# Patient Record
Sex: FEMALE | Race: WHITE | NOT HISPANIC OR LATINO | Employment: FULL TIME | URBAN - METROPOLITAN AREA
[De-identification: names, ages, dates, MRNs, and addresses within clinical notes are randomized per-mention and may not be internally consistent; named-entity substitution may affect disease eponyms.]

---

## 2017-07-10 ENCOUNTER — HOSPITAL ENCOUNTER (OUTPATIENT)
Dept: LAB | Facility: HOSPITAL | Age: 52
Setting detail: SPECIMEN
Discharge: HOME | End: 2017-07-10
Attending: FAMILY MEDICINE | Admitting: FAMILY MEDICINE

## 2017-07-10 LAB
AMPICILLIN SUSC ISLT: NORMAL
AZTREONAM SUSC ISLT: NORMAL
BACTERIA ISLT: NORMAL
BACTERIA SPEC AEROBE CULT: NORMAL
CEFAZOLIN SUSC ISLT: NORMAL
CEFEPIME SUSC ISLT: NORMAL
CEFTRIAXONE SUSC ISLT: NORMAL
CIPROFLOXACIN SUSC ISLT: NORMAL
COLONY COUNT: NORMAL
ERTAPENEM SUSC ISLT: NORMAL
LEVOFLOXACIN SUSC ISLT: NORMAL
Lab: NORMAL
MEROPENEM SUSC ISLT: NORMAL
MICRO REPORT STATUS: NORMAL
NITROFURANTOIN SUSC ISLT: NORMAL
PIP+TAZO SUSC ISLT: NORMAL
SPECIMEN SOURCE: NORMAL
SUSC METH SPEC: NORMAL
TETRACYCLINE SUSC ISLT: NORMAL
TOBRAMYCIN SUSC ISLT: NORMAL
TRIMETHOPRIM/SULFA: NORMAL

## 2017-07-13 ENCOUNTER — HOSPITAL ENCOUNTER (OUTPATIENT)
Dept: SLEEP MEDICINE | Facility: HOSPITAL | Age: 52
Discharge: HOME | End: 2017-07-13
Attending: INTERNAL MEDICINE | Admitting: INTERNAL MEDICINE

## 2017-10-09 ENCOUNTER — HOSPITAL ENCOUNTER (OUTPATIENT)
Dept: SLEEP MEDICINE | Facility: HOSPITAL | Age: 52
Discharge: HOME | End: 2017-10-09
Attending: INTERNAL MEDICINE | Admitting: INTERNAL MEDICINE

## 2017-12-19 ENCOUNTER — HOSPITAL ENCOUNTER (OUTPATIENT)
Dept: MAMMOGRAPHY | Facility: HOSPITAL | Age: 52
Discharge: HOME | End: 2017-12-19
Attending: OBSTETRICS & GYNECOLOGY | Admitting: OBSTETRICS & GYNECOLOGY

## 2018-03-01 ENCOUNTER — HOSPITAL ENCOUNTER (OUTPATIENT)
Dept: LAB | Facility: HOSPITAL | Age: 53
Discharge: HOME | End: 2018-03-01
Attending: FAMILY MEDICINE | Admitting: FAMILY MEDICINE

## 2018-03-06 ENCOUNTER — ON CAMPUS - OUTPATIENT (AMBULATORY)
Dept: URBAN - METROPOLITAN AREA HOSPITAL 85 | Facility: HOSPITAL | Age: 53
End: 2018-03-06

## 2018-03-06 DIAGNOSIS — K57.90 DIVERTICULOSIS OF INTESTINE, PART UNSPECIFIED, WITHOUT PERFO: ICD-10-CM

## 2018-03-06 DIAGNOSIS — D50.9 IRON DEFICIENCY ANEMIA, UNSPECIFIED: ICD-10-CM

## 2018-03-06 DIAGNOSIS — K64.8 OTHER HEMORRHOIDS: ICD-10-CM

## 2018-03-06 DIAGNOSIS — K28.9 GASTROJEJUNAL ULCER, UNSPECIFIED AS ACUTE OR CHRONIC, WITHOU: ICD-10-CM

## 2018-03-06 DIAGNOSIS — R10.13 EPIGASTRIC PAIN: ICD-10-CM

## 2018-03-06 DIAGNOSIS — K62.5 HEMORRHAGE OF ANUS AND RECTUM: ICD-10-CM

## 2018-03-06 DIAGNOSIS — R11.0 NAUSEA: ICD-10-CM

## 2018-03-06 PROCEDURE — 43235 EGD DIAGNOSTIC BRUSH WASH: CPT | Performed by: INTERNAL MEDICINE

## 2018-03-06 PROCEDURE — 45378 DIAGNOSTIC COLONOSCOPY: CPT | Performed by: INTERNAL MEDICINE

## 2018-10-11 ENCOUNTER — HOSPITAL ENCOUNTER (OUTPATIENT)
Dept: SLEEP MEDICINE | Facility: HOSPITAL | Age: 53
Discharge: HOME | End: 2018-10-11
Attending: INTERNAL MEDICINE | Admitting: INTERNAL MEDICINE

## 2018-12-31 ENCOUNTER — HOSPITAL ENCOUNTER (OUTPATIENT)
Dept: MAMMOGRAPHY | Facility: HOSPITAL | Age: 53
Discharge: HOME | End: 2018-12-31
Attending: OBSTETRICS & GYNECOLOGY | Admitting: OBSTETRICS & GYNECOLOGY

## 2019-10-10 ENCOUNTER — APPOINTMENT (OUTPATIENT)
Dept: SLEEP MEDICINE | Facility: HOSPITAL | Age: 54
End: 2019-10-10

## 2019-10-17 ENCOUNTER — OFFICE VISIT (OUTPATIENT)
Dept: SLEEP MEDICINE | Facility: HOSPITAL | Age: 54
End: 2019-10-17

## 2019-10-17 VITALS
HEART RATE: 76 BPM | OXYGEN SATURATION: 94 % | BODY MASS INDEX: 45.79 KG/M2 | HEIGHT: 65 IN | WEIGHT: 274.8 LBS | SYSTOLIC BLOOD PRESSURE: 149 MMHG | DIASTOLIC BLOOD PRESSURE: 90 MMHG

## 2019-10-17 DIAGNOSIS — G47.33 OBSTRUCTIVE SLEEP APNEA, ADULT: Primary | ICD-10-CM

## 2019-10-17 PROCEDURE — G0463 HOSPITAL OUTPT CLINIC VISIT: HCPCS

## 2019-10-17 RX ORDER — MELATONIN
1000 DAILY
COMMUNITY

## 2019-10-17 RX ORDER — OMEPRAZOLE 40 MG/1
40 CAPSULE, DELAYED RELEASE ORAL DAILY
COMMUNITY

## 2019-10-17 RX ORDER — DILTIAZEM HYDROCHLORIDE 180 MG/1
180 CAPSULE, EXTENDED RELEASE ORAL DAILY
COMMUNITY
End: 2020-03-24 | Stop reason: SDUPTHER

## 2019-10-17 RX ORDER — SUCRALFATE 1 G/1
1 TABLET ORAL 4 TIMES DAILY
COMMUNITY

## 2019-10-17 RX ORDER — MAGNESIUM CARB/ALUMINUM HYDROX 105-160MG
TABLET,CHEWABLE ORAL ONCE
COMMUNITY
End: 2020-04-07

## 2019-10-17 RX ORDER — ASPIRIN 325 MG
325 TABLET, DELAYED RELEASE (ENTERIC COATED) ORAL DAILY
COMMUNITY
End: 2021-04-08 | Stop reason: DRUGHIGH

## 2019-10-17 RX ORDER — FLUOXETINE HYDROCHLORIDE 20 MG/1
20 CAPSULE ORAL DAILY
COMMUNITY

## 2019-10-17 RX ORDER — CHOLECALCIFEROL (VITAMIN D3) 125 MCG
500 CAPSULE ORAL DAILY
COMMUNITY
End: 2020-04-07

## 2019-10-17 NOTE — PROGRESS NOTES
SLEEP MEDICINE CONSULT      Patient Identification:     Name: Bertha Peralta   Age: 53 y.o.   Gender: female   : 1965   MRN: [id number]     Date of consult: 10/17/2019    PCP/Referring Physician(s):     PCP: Betsy Pham MD  Referring Provider: Nathalie Le MD      Chief Complaint:   Obstructive sleep apnea.  Yearly follow-up for compliance.    History of Present Illness:   This is a very pleasant 53 years old  lady who is here today for yearly follow-up for compliance.  Memory card has been downloaded.    She uses a DreamWear full facemask small size.  She finds the mask quite comfortable no air leak did by her.  She finds a pressure quite comfortable.  She uses humidifier on regular basis.  She would like a new machine.  She uses a auto BiPAP mode of therapy with a pressure range between 12-18 and pressure support of 6.    Her usual bedtime is between 10 PM to 11 PM.  Usually dozes off within an hour or so.  She usually wakes up at 6 in the morning but she starts her day at 7 AM.  He has woken up several times at night especially when she changes position.  Pain in neck and other joints wakes her up.  She has a pet cat in her bedroom which disturbs her sleep.  She describes herself a light sleeper easily awakened by noises.  She has woken up twice sometimes at night for a bathroom trip.  She has headache in the morning which is slowly decreasing in severity as well as intensity with the use of CBD oil.  She has severe cervical degenerative joint disease.  Occasionally has a dry mouth at night as well as in the morning.  Sinus congestion at night as well as in the morning.  She denies any symptoms of palpitations or heartburn at night or in the morning.    She wakes up in the morning somewhat awake and alert.  She remains active throughout the day.  Sometimes takes a nap during the daytime.  She tends to doze off during sedentary activities.  Her [place] sleepiness score is  15/24.    Allergies, Medications, and Past History:   Allergies:    No Known Allergies  Current Medications:    Prior to Admission medications    Medication Sig Start Date End Date Taking? Authorizing Provider   aspirin (ECOTRIN) 325 MG EC tablet Take 325 mg by mouth Daily.   Yes Sarah Waters MD   carbonyl iron (FEOSOL) 45 MG tablet tablet Take  by mouth Daily.   Yes Sarah Waters MD   cholecalciferol (VITAMIN D3) 1000 units tablet Take 1,000 Units by mouth Daily.   Yes Sarah Waters MD   dilTIAZem XR (DILT-XR) 180 MG 24 hr capsule Take 180 mg by mouth Daily.   Yes Sarah Waters MD   FLUoxetine (PROzac) 20 MG capsule Take 20 mg by mouth Daily.   Yes Sarah Waters MD   magnesium citrate 1.745 GM/30ML solution solution Take  by mouth 1 (One) Time.   Yes Sarah Waters MD   metFORMIN (GLUCOPHAGE) 500 MG tablet Take 500 mg by mouth 2 (Two) Times a Day With Meals.   Yes Sarah Waters MD   omeprazole (priLOSEC) 40 MG capsule Take 40 mg by mouth Daily.   Yes Sarah Waters MD   rivaroxaban (XARELTO) 20 MG tablet Take 20 mg by mouth Daily.   Yes Sarah Waters MD   sucralfate (CARAFATE) 1 g tablet Take 1 g by mouth 4 (Four) Times a Day.   Yes Sarah Waters MD   vitamin B-12 (CYANOCOBALAMIN) 500 MCG tablet Take 500 mcg by mouth Daily.   Yes Sarah Waters MD     Past Medical History:    Past Medical History:   Diagnosis Date   • AF (paroxysmal atrial fibrillation) (CMS/[facility])    • Coronary artery disease    • GERD (gastroesophageal reflux disease)       Past Surgical History:    Past Surgical History:   Procedure Laterality Date   • CORONARY ARTERY BYPASS GRAFT        Social History:    Social History     Tobacco Use   • Smoking status: Not on file   Substance Use Topics   • Alcohol use: Not on file   • Drug use: Not on file     Family Medical History:  History reviewed. No pertinent family history.      Review of Systems:   Constitutional:   "Denies fever or chills and weight gain  Eyes:  Denies change in visual acuity   HENT:  nasal congestion, sore throat or post nasal drainage change of seasons.  Respiratory:  Denies cough, shortness of breath.  She has experienced  dyspnea on exertion    Cardiovascular:  Denies chest pain or edema   GI:  Denies abdominal pain, nausea, vomiting, bloody stools or diarrhea .  No aerophagia.  :  Denies dysuria or nocturia   Musculoskeletal: She has degenerative joint disease involving cervical as well as lumbosacral spine.  It results in severe headaches.  He takes CBD oil which has decreased her symptoms.    Integument:  Denies rash   Neurologic: Headaches, decreased in severity whith  CBD oil.  n focal weakness or sensory changes. No history of stroke or seizures.  Episodes of loss of consciousness preceded by dizziness and vertigo.  Endocrine:  Denies polyuria or polydipsia   Lymphatic:  Denies swollen glands   Psychiatric:  Denies depression, anxiety or claustrophobia      Physical Exam:     Vitals:    10/17/19 1408   BP: 149/90   Pulse: 76   SpO2: 94%   Weight: 125 kg (274 lb 12.8 oz)   Height: 165.1 cm (65\")     HEENT: Head is normocephalic, atraumatic, normal distribution of hair. Pupils reactive to light. Ocular movements intact.  Moderate nasal congestion on sniff test. No deviated nasal septum. No micrognathia.  Throat: Mallapatti stage 4, tongue midline, mucosa moist.  Neck: no JVD, thyromegaly, mass, +midline trachea, Neck short .  No lymphadenopathy.  Lungs: clear, no wheeze, rhonchi, crackles  Cardiovascular: S1, S2, RRR no murmurs, rubs or gallops  Abd: +BS's soft NT/ND, no CVA tenderness.  Obese.   Ext: no edema, cyanosis, clubbing, pulses intact  Neuro: Awake alert, oriented to time place and person. Grossly intact to motor, sensory cerebral, and cerebellar (without focal deficit)  Psych: No overt kurt, depression, psychosis or inappropriate behavior  Skin: No rash, cellulitis, or ulcerations.  No " facial rash or erosions    DIAGNOSTIC DATA  Memory download shows data from 10/11/2018 to 10/16/2019.  Overall 371 days of data reviewed.  12 days without use.  Percentage of use 96.8%.  Maximum hours used 14 hours 1 minute.  Minimum time used 15 minutes.  96.5% of the time the mask has been used for more than 4 hours.  The apnea hypopnea index is 1.6 events per hour on auto BiPAP mode of therapy with a pressure range between 12-18 and pressure support of 6.  Assessment/Plan:   Obstructive sleep apnea:  This is a very pleasant lady who underwent a nocturnal polysomnogram several years ago.  She presently uses an auto BiPAP mode of therapy.  Her equipment is quite old.  Her humidifier sometimes malfunctions.  She is compliant with therapy.  She is getting some benefit from it.    The results of memory card download were discussed in detail with the patient all questions were answered.  Recommendation.  Advised to continue using the mask with given pressures on a nightly basis.  She is advised to use the mask with given pressures for at least 4 hours a minimum benefit or as long as she sleeps a maximum benefit.  She is advised to use the mask with given pressures if she takes a nap during the daytime.  Allergic rhinitis:  She remains symptomatic.  Recommendation.  Aggressive therapy is recommended.  Obesity:  Based on BMI of 85.7.  Recommendation.  Weight loss is recommended.  Driving instructions. Patient is advised to avoid driving if tired or somnolent. To avoid all alcoholic beverages or medications causing somnolence.         Diagnosis Plan   1. Obstructive sleep apnea, adult  CPAP Therapy     No orders of the defined types were placed in this encounter.    Orders Placed This Encounter   Procedures   • CPAP Therapy     Order Specific Question:   PAP Machine Brand     Answer:   Respironics     Order Specific Question:   PAP Tubing (1 per 3 months)     Answer:    6' Standard tubing     Order Specific Question:    PAP Mask (1 per 3 months)     Answer:    Full face mask     Order Specific Question:   Mask interface (1 per month)     Answer:    Face Mask Interface     Order Specific Question:   PAP Accessories     Answer:    Water Chamber for PAP Humidifier (1 per 6 month) &  Filter PAP Disposable (2 per month) &  Filter PAP Non-Disposable (1 per 6 months) &  Chinstrap to be used with PAP (1 per 6 months) &  Headgear to be used with PAP (1 per 6 mo)     Order Specific Question:   Does the patient have Obstructive Sleep Apnea or other qualifying diagnosis for PAP ordered?     Answer:   Yes     Order Specific Question:   Does the patient require humidification?     Answer:   Yes     Order Specific Question:   Humidifier     Answer:    Humidifier Heated for CPAP or BiPAP     Order Specific Question:   The patient requires a PAP Device & continued use of Supplies to administer effective PAP therapy at the frequency of use ordered above     Answer:   Yes     Order Specific Question:   Initial Supplies and refills authorized for 12 months?     Answer:   Yes     Order Specific Question:   The face to face evaluation was performed on     Answer:   10/17/2019     Order Specific Question:   Which [facility] [place] this being sent to?     Answer:   [facility] [[id number]]     Order Specific Question:   Length of Need (99 Months = Lifetime)     Answer:   99 Months = Lifetime       This document has been electronically signed by:  Nathalie Le MD  10/17/2019  5:23 PM

## 2019-12-23 ENCOUNTER — TRANSCRIBE ORDERS (OUTPATIENT)
Dept: ADMINISTRATIVE | Facility: HOSPITAL | Age: 54
End: 2019-12-23

## 2019-12-23 DIAGNOSIS — Z12.31 BREAST CANCER SCREENING BY MAMMOGRAM: Primary | ICD-10-CM

## 2020-01-02 ENCOUNTER — HOSPITAL ENCOUNTER (OUTPATIENT)
Dept: MAMMOGRAPHY | Facility: HOSPITAL | Age: 55
Discharge: HOME | End: 2020-01-02
Admitting: FAMILY MEDICINE

## 2020-01-02 DIAGNOSIS — Z12.31 BREAST CANCER SCREENING BY MAMMOGRAM: ICD-10-CM

## 2020-01-02 PROCEDURE — 77067 SCR MAMMO BI INCL CAD: CPT

## 2020-01-02 PROCEDURE — 77063 BREAST TOMOSYNTHESIS BI: CPT

## 2020-03-23 PROBLEM — F32.A DEPRESSION: Status: ACTIVE | Noted: 2020-03-23

## 2020-03-23 PROBLEM — I10 BENIGN ESSENTIAL HYPERTENSION: Status: ACTIVE | Noted: 2017-07-21

## 2020-03-23 PROBLEM — I48.0 PAROXYSMAL ATRIAL FIBRILLATION: Status: ACTIVE | Noted: 2020-03-23

## 2020-03-23 PROBLEM — G47.30 SLEEP APNEA: Status: ACTIVE | Noted: 2020-03-23

## 2020-03-23 PROBLEM — K21.9 GASTROESOPHAGEAL REFLUX DISEASE: Status: ACTIVE | Noted: 2020-03-23

## 2020-03-23 RX ORDER — CALCIUM CARBONATE 300MG(750)
TABLET,CHEWABLE ORAL DAILY
COMMUNITY
Start: 2017-07-21

## 2020-03-24 ENCOUNTER — TELEPHONE (OUTPATIENT)
Dept: CARDIOLOGY | Facility: CLINIC | Age: 55
End: 2020-03-24

## 2020-03-24 RX ORDER — DILTIAZEM HYDROCHLORIDE 180 MG/1
180 CAPSULE, EXTENDED RELEASE ORAL DAILY
Qty: 90 CAPSULE | Refills: 3 | Status: SHIPPED | OUTPATIENT
Start: 2020-03-24 | End: 2021-01-12 | Stop reason: SDUPTHER

## 2020-03-26 ENCOUNTER — LAB REQUISITION (OUTPATIENT)
Dept: LAB | Facility: HOSPITAL | Age: 55
End: 2020-03-26

## 2020-03-26 DIAGNOSIS — Z00.00 ENCOUNTER FOR GENERAL ADULT MEDICAL EXAMINATION WITHOUT ABNORMAL FINDINGS: ICD-10-CM

## 2020-03-26 PROCEDURE — U0003 INFECTIOUS AGENT DETECTION BY NUCLEIC ACID (DNA OR RNA); SEVERE ACUTE RESPIRATORY SYNDROME CORONAVIRUS 2 (SARS-COV-2) (CORONAVIRUS DISEASE [COVID-19]), AMPLIFIED PROBE TECHNIQUE, MAKING USE OF HIGH THROUGHPUT TECHNOLOGIES AS DESCRIBED BY CMS-2020-01-R: HCPCS | Performed by: EMERGENCY MEDICINE

## 2020-03-26 PROCEDURE — 87635 SARS-COV-2 COVID-19 AMP PRB: CPT | Performed by: EMERGENCY MEDICINE

## 2020-04-04 LAB — SARS-COV-2 RNA RESP QL NAA+PROBE: NOT DETECTED

## 2020-04-07 ENCOUNTER — OFFICE VISIT (OUTPATIENT)
Dept: CARDIOLOGY | Facility: CLINIC | Age: 55
End: 2020-04-07

## 2020-04-07 VITALS — HEART RATE: 78 BPM | WEIGHT: 285 LBS | BODY MASS INDEX: 47.48 KG/M2 | HEIGHT: 65 IN

## 2020-04-07 DIAGNOSIS — I48.0 PAROXYSMAL ATRIAL FIBRILLATION (HCC): ICD-10-CM

## 2020-04-07 DIAGNOSIS — G47.30 SLEEP APNEA, UNSPECIFIED TYPE: ICD-10-CM

## 2020-04-07 DIAGNOSIS — I10 ESSENTIAL HYPERTENSION: Primary | ICD-10-CM

## 2020-04-07 PROCEDURE — 93000 ELECTROCARDIOGRAM COMPLETE: CPT | Performed by: INTERNAL MEDICINE

## 2020-04-07 PROCEDURE — 99213 OFFICE O/P EST LOW 20 MIN: CPT | Performed by: INTERNAL MEDICINE

## 2020-04-07 RX ORDER — FLUOXETINE HYDROCHLORIDE 40 MG/1
1 CAPSULE ORAL DAILY
COMMUNITY
Start: 2020-02-29

## 2020-04-07 RX ORDER — ALBUTEROL SULFATE 2.5 MG/3ML
SOLUTION RESPIRATORY (INHALATION) AS NEEDED
COMMUNITY
Start: 2020-03-26

## 2020-04-07 RX ORDER — CYANOCOBALAMIN 1000 UG/ML
INJECTION, SOLUTION INTRAMUSCULAR; SUBCUTANEOUS
COMMUNITY
Start: 2020-03-16

## 2020-04-07 NOTE — PROGRESS NOTES
Cardiology Office Visit Note      Referring physician:  Dr. Betsy Pham    Reason For Followup: 1 year follow up a-fib/htn    HPI:  Bertha Peralta is a 54 y.o. female. presents annual FU  history of atypical chest pain,  PAF, hypertensive heart disease, RUTHIE, anxiety and depressive disorder.  Presently maintains SR.    Stress myoview with no reversible ischemia, echocardiogram essentially normal   2017.      Patient activity level is light - moderate    She denies chest pain, dyspnea, PND, orthopnea,  , near syncope, lower extremity edema or feelings of her heart racing.  She has been compliant with her medications.         Past Medical History:   Diagnosis Date   • AF (paroxysmal atrial fibrillation) (CMS/HCC)    • Coronary artery disease    • Essential hypertension 7/21/2017   • Gastroesophageal reflux disease 3/23/2020   • GERD (gastroesophageal reflux disease)    • Paroxysmal atrial fibrillation (CMS/HCC) 3/23/2020   • Sleep apnea 3/23/2020       Past Surgical History:   Procedure Laterality Date   • BREAST BIOPSY     • CORONARY ARTERY BYPASS GRAFT     • HYSTERECTOMY           Current Outpatient Medications:   •  albuterol (PROVENTIL) (2.5 MG/3ML) 0.083% nebulizer solution, As Needed., Disp: , Rfl:   •  aspirin (ECOTRIN) 325 MG EC tablet, Take 325 mg by mouth Daily., Disp: , Rfl:   •  CARBONYL IRON PO, Take 1 tablet by mouth 3 (Three) Times a Week. 65 mg, Disp: , Rfl:   •  cholecalciferol (VITAMIN D3) 1000 units tablet, Take 1,000 Units by mouth Daily., Disp: , Rfl:   •  cyanocobalamin 1000 MCG/ML injection, Every 30 (Thirty) Days., Disp: , Rfl:   •  dilTIAZem XR (Dilt-XR) 180 MG 24 hr capsule, Take 1 capsule by mouth Daily., Disp: 90 capsule, Rfl: 3  •  FLUoxetine (PROzac) 20 MG capsule, Take 20 mg by mouth Daily., Disp: , Rfl:   •  FLUoxetine (PROzac) 40 MG capsule, 1 capsule Daily., Disp: , Rfl:   •  Magnesium 400 MG tablet, MAGNESIUM 400 MG TABS, Disp: , Rfl:   •  metFORMIN (GLUCOPHAGE) 500 MG tablet, Take  "500 mg by mouth 2 (Two) Times a Day With Meals., Disp: , Rfl:   •  omeprazole (priLOSEC) 40 MG capsule, Take 40 mg by mouth Daily., Disp: , Rfl:   •  rivaroxaban (XARELTO) 20 MG tablet, Take 20 mg by mouth Daily., Disp: , Rfl:   •  sucralfate (CARAFATE) 1 g tablet, Take 1 g by mouth 4 (Four) Times a Day., Disp: , Rfl:     Social History     Socioeconomic History   • Marital status:      Spouse name: Not on file   • Number of children: Not on file   • Years of education: Not on file   • Highest education level: Not on file   Tobacco Use   • Smoking status: Never Smoker   • Smokeless tobacco: Never Used   Substance and Sexual Activity   • Alcohol use: Not Currently   • Drug use: Never   • Sexual activity: Defer       Family History   Problem Relation Age of Onset   • Breast cancer Mother 35   • Breast cancer Maternal Grandmother          Review of Systems   General: denies fever, chills, anorexia, weight loss  Eyes: denies blurring, diplopia  Ear/Nose/Throat: denies ear pain, nosebleeds, hoarseness  Cardiovascular: See HPI  Respiratory: denies excessive sputum, hemoptysis, wheezing  Gastrointestinal: denies nausea, vomiting, change in bowel habits, abdominal pain  Genitourinary: Denies hematuria or dysuria  Musculoskeletal: Mild to moderate weightbearing arthralgia  Skin: denies rashes, itching, suspicious lesions  Neurologic: denies focal neuro deficits  Psychiatric: denies depression, anxiety  Endocrine: denies cold intolerance, heat intolerance  Hematologic/Lymphatic: denies abnormal bruising, bleeding  Allergic/Immunologic: denies urticaria or persistent infections      Objective     Visit Vitals  Pulse 78   Ht 165.1 cm (65\")   Wt 129 kg (285 lb)   BMI 47.43 kg/m²           Physical Exam  General:     Obese, well developed,, in no acute distress.    Head:     normocephalic and atraumatic.    Eyes:    PERRL/EOM intact, conjunctiva and sclera clear with out nystagmus.    Neck:    no jvd or bruits  Chest Wall: "    no deformities   Lungs:    clear bilaterally to auscultation with adequate global airflow   Heart:    non-displaced PMI; regular rate and rhythm, normal S1, S2 without murmurs, rubs, or gallops  Abdomen:  Soft, nontender without HSM  Msk:    no deformity; adequate R OM  Pulses:    pulses normal in all 4 extremities.    Extremities:    no clubbing, cyanosis, edema   Neurologic:    no focal sensory or motor deficits  Skin:    intact without lesions or rashes.    Psych:    alert and cooperative; normal mood and affect; normal attention span and concentration.            ECG 12 Lead  Date/Time: 4/7/2020 7:36 AM  Performed by: BRIDGETTE Cruz MD  Authorized by: BRIDGETTE Cruz MD   Comparison: compared with previous ECG from 4/8/2019  Similar to previous ECG  Comments: Sinus rhythm with poor anterior R wave progression              Assessment:   Problems Addressed this Visit        Cardiovascular and Mediastinum    Essential hypertension - Primary  -Remains well-regulated on current medications      Paroxysmal atrial fibrillation (CMS/HCC)  -Maintained in sinus rhythm and fully anticoagulated       Respiratory    Sleep apnea  -Compliant to CPAP            Plan:  Return to clinic 1 year or sooner if needed  Strongly encourage regular progressive exercise with weight reduction diet.  Continue current medications as listed and reviewed in detail    BRIDGETTE Cruz MD  4/7/2020 10:37    This report was generated using the Dragon voice recognition system.

## 2020-08-06 ENCOUNTER — HOSPITAL ENCOUNTER (EMERGENCY)
Facility: HOSPITAL | Age: 55
Discharge: HOME | End: 2020-08-06
Admitting: EMERGENCY MEDICINE

## 2020-08-06 ENCOUNTER — APPOINTMENT (OUTPATIENT)
Dept: GENERAL RADIOLOGY | Facility: HOSPITAL | Age: 55
End: 2020-08-06

## 2020-08-06 VITALS
DIASTOLIC BLOOD PRESSURE: 78 MMHG | WEIGHT: 276.68 LBS | HEIGHT: 65 IN | BODY MASS INDEX: 46.1 KG/M2 | SYSTOLIC BLOOD PRESSURE: 144 MMHG | RESPIRATION RATE: 18 BRPM | TEMPERATURE: 100.9 F | OXYGEN SATURATION: 98 % | HEART RATE: 80 BPM

## 2020-08-06 DIAGNOSIS — R05.9 COUGH: ICD-10-CM

## 2020-08-06 DIAGNOSIS — U07.1 COVID-19: Primary | ICD-10-CM

## 2020-08-06 DIAGNOSIS — R50.9 FEVER, UNSPECIFIED FEVER CAUSE: ICD-10-CM

## 2020-08-06 LAB
ALBUMIN SERPL-MCNC: 3.8 G/DL (ref 3.5–5.2)
ALBUMIN/GLOB SERPL: 1.4 G/DL
ALP SERPL-CCNC: 126 U/L (ref 39–117)
ALT SERPL W P-5'-P-CCNC: 44 U/L (ref 1–33)
ANION GAP SERPL CALCULATED.3IONS-SCNC: 13 MMOL/L (ref 5–15)
AST SERPL-CCNC: 29 U/L (ref 1–32)
BASOPHILS # BLD AUTO: 0 10*3/MM3 (ref 0–0.2)
BASOPHILS NFR BLD AUTO: 0.8 % (ref 0–1.5)
BILIRUB SERPL-MCNC: 0.2 MG/DL (ref 0–1.2)
BUN SERPL-MCNC: 11 MG/DL (ref 6–20)
BUN SERPL-MCNC: ABNORMAL MG/DL
BUN/CREAT SERPL: ABNORMAL
CALCIUM SPEC-SCNC: 8.6 MG/DL (ref 8.6–10.5)
CHLORIDE SERPL-SCNC: 103 MMOL/L (ref 98–107)
CO2 SERPL-SCNC: 24 MMOL/L (ref 22–29)
CREAT SERPL-MCNC: 0.73 MG/DL (ref 0.57–1)
D-LACTATE SERPL-SCNC: 1.8 MMOL/L (ref 0.5–2)
DEPRECATED RDW RBC AUTO: 46.4 FL (ref 37–54)
EOSINOPHIL # BLD AUTO: 0 10*3/MM3 (ref 0–0.4)
EOSINOPHIL NFR BLD AUTO: 0.3 % (ref 0.3–6.2)
ERYTHROCYTE [DISTWIDTH] IN BLOOD BY AUTOMATED COUNT: 16.2 % (ref 12.3–15.4)
FERRITIN SERPL-MCNC: 33.08 NG/ML (ref 13–150)
GFR SERPL CREATININE-BSD FRML MDRD: 82 ML/MIN/1.73
GLOBULIN UR ELPH-MCNC: 2.8 GM/DL
GLUCOSE SERPL-MCNC: 119 MG/DL (ref 65–99)
HCT VFR BLD AUTO: 33.9 % (ref 34–46.6)
HGB BLD-MCNC: 11.1 G/DL (ref 12–15.9)
HOLD SPECIMEN: NORMAL
LYMPHOCYTES # BLD AUTO: 1.6 10*3/MM3 (ref 0.7–3.1)
LYMPHOCYTES NFR BLD AUTO: 33.4 % (ref 19.6–45.3)
MCH RBC QN AUTO: 26.4 PG (ref 26.6–33)
MCHC RBC AUTO-ENTMCNC: 32.6 G/DL (ref 31.5–35.7)
MCV RBC AUTO: 80.9 FL (ref 79–97)
MONOCYTES # BLD AUTO: 0.4 10*3/MM3 (ref 0.1–0.9)
MONOCYTES NFR BLD AUTO: 8.1 % (ref 5–12)
NEUTROPHILS NFR BLD AUTO: 2.8 10*3/MM3 (ref 1.7–7)
NEUTROPHILS NFR BLD AUTO: 57.4 % (ref 42.7–76)
NRBC BLD AUTO-RTO: 0.1 /100 WBC (ref 0–0.2)
PLATELET # BLD AUTO: 307 10*3/MM3 (ref 140–450)
PMV BLD AUTO: 6.6 FL (ref 6–12)
POTASSIUM SERPL-SCNC: 3.9 MMOL/L (ref 3.5–5.2)
PROT SERPL-MCNC: 6.6 G/DL (ref 6–8.5)
RBC # BLD AUTO: 4.19 10*6/MM3 (ref 3.77–5.28)
SODIUM SERPL-SCNC: 140 MMOL/L (ref 136–145)
TROPONIN T SERPL-MCNC: <0.01 NG/ML (ref 0–0.03)
WBC # BLD AUTO: 4.9 10*3/MM3 (ref 3.4–10.8)
WHOLE BLOOD HOLD SPECIMEN: NORMAL

## 2020-08-06 PROCEDURE — 85025 COMPLETE CBC W/AUTO DIFF WBC: CPT | Performed by: NURSE PRACTITIONER

## 2020-08-06 PROCEDURE — 82728 ASSAY OF FERRITIN: CPT | Performed by: NURSE PRACTITIONER

## 2020-08-06 PROCEDURE — 94799 UNLISTED PULMONARY SVC/PX: CPT

## 2020-08-06 PROCEDURE — 99284 EMERGENCY DEPT VISIT MOD MDM: CPT

## 2020-08-06 PROCEDURE — 93005 ELECTROCARDIOGRAM TRACING: CPT

## 2020-08-06 PROCEDURE — 84484 ASSAY OF TROPONIN QUANT: CPT | Performed by: NURSE PRACTITIONER

## 2020-08-06 PROCEDURE — 83605 ASSAY OF LACTIC ACID: CPT

## 2020-08-06 PROCEDURE — 0202U NFCT DS 22 TRGT SARS-COV-2: CPT | Performed by: NURSE PRACTITIONER

## 2020-08-06 PROCEDURE — 94640 AIRWAY INHALATION TREATMENT: CPT

## 2020-08-06 PROCEDURE — 80053 COMPREHEN METABOLIC PANEL: CPT | Performed by: NURSE PRACTITIONER

## 2020-08-06 PROCEDURE — 87040 BLOOD CULTURE FOR BACTERIA: CPT | Performed by: NURSE PRACTITIONER

## 2020-08-06 PROCEDURE — 71045 X-RAY EXAM CHEST 1 VIEW: CPT

## 2020-08-06 RX ORDER — ALBUTEROL SULFATE 90 UG/1
2 AEROSOL, METERED RESPIRATORY (INHALATION) ONCE
Status: COMPLETED | OUTPATIENT
Start: 2020-08-06 | End: 2020-08-06

## 2020-08-06 RX ORDER — SODIUM CHLORIDE 0.9 % (FLUSH) 0.9 %
10 SYRINGE (ML) INJECTION AS NEEDED
Status: DISCONTINUED | OUTPATIENT
Start: 2020-08-06 | End: 2020-08-07 | Stop reason: HOSPADM

## 2020-08-06 RX ADMIN — SODIUM CHLORIDE, SODIUM LACTATE, POTASSIUM CHLORIDE, AND CALCIUM CHLORIDE 1000 ML: .6; .31; .03; .02 INJECTION, SOLUTION INTRAVENOUS at 20:27

## 2020-08-06 RX ADMIN — ALBUTEROL SULFATE 2 PUFF: 90 AEROSOL, METERED RESPIRATORY (INHALATION) at 21:11

## 2020-08-06 NOTE — ED PROVIDER NOTES
"Subjective   54-year-old female presents with multiple complaints; one-week history of: cough, dyspnea, melena, low-grade temp status post \"I took a pill last week and choked on it and it went into my lungs\".  Patient reports that her symptoms have progressively gotten worse over the last week.  She reports she called her primary care physician and was sent in for further work-up.  She also states that she has a history of A. fib is currently taking Xarelto.    1. Location: Anterior chest and ribs  2. Quality: Sore  3. Severity: Mild to moderate  4. Worsening factors: Cough  5. Alleviating factors: Denies  6. Onset: 1 week, progressively getting worse  7. Radiation: Shoulder blades  8. Frequency: Constant with periods of intensity  9. Co-morbidities: Past Medical History:  No date: AF (paroxysmal atrial fibrillation) (CMS/[facility])  No date: Coronary artery disease  7/21/2017: Essential hypertension  3/23/2020: Gastroesophageal reflux disease  No date: GERD (gastroesophageal reflux disease)  3/23/2020: Paroxysmal atrial fibrillation (CMS/[facility])  3/23/2020: Sleep apnea  10. Source: Patient            Review of Systems   Constitutional: Positive for fever. Negative for chills and diaphoresis.   HENT: Negative for congestion, ear pain, rhinorrhea, sneezing, sore throat, trouble swallowing and voice change.    Respiratory: Positive for cough and shortness of breath. Negative for chest tightness, wheezing and stridor.    Cardiovascular: Positive for chest pain. Negative for palpitations and leg swelling.   Gastrointestinal: Positive for blood in stool (melena-today). Negative for abdominal pain, nausea and vomiting.   Genitourinary: Negative for dysuria and flank pain.   Skin: Negative for color change, pallor and rash.   Allergic/Immunologic: Negative for immunocompromised state.   Neurological: Negative for dizziness and weakness.   Psychiatric/Behavioral: Negative for confusion.   All other systems reviewed and are " negative.      Past Medical History:   Diagnosis Date   • AF (paroxysmal atrial fibrillation) (CMS/HCC)    • Coronary artery disease    • Essential hypertension 7/21/2017   • Gastroesophageal reflux disease 3/23/2020   • GERD (gastroesophageal reflux disease)    • Paroxysmal atrial fibrillation (CMS/HCC) 3/23/2020   • Sleep apnea 3/23/2020       No Known Allergies    Past Surgical History:   Procedure Laterality Date   • BREAST BIOPSY     • CORONARY ARTERY BYPASS GRAFT     • HYSTERECTOMY         Family History   Problem Relation Age of Onset   • Breast cancer Mother 35   • Breast cancer Maternal Grandmother        Social History     Socioeconomic History   • Marital status:      Spouse name: Not on file   • Number of children: Not on file   • Years of education: Not on file   • Highest education level: Not on file   Tobacco Use   • Smoking status: Never Smoker   • Smokeless tobacco: Never Used   Substance and Sexual Activity   • Alcohol use: Not Currently   • Drug use: Never   • Sexual activity: Defer           Objective   Physical Exam   Constitutional: She is oriented to person, place, and time. She appears well-developed and well-nourished. She is active and cooperative.  Non-toxic appearance. No distress.   HENT:   Head: Normocephalic and atraumatic.   Right Ear: External ear normal. No drainage.   Left Ear: External ear normal. No drainage.   Nose: Nose normal. No rhinorrhea.   Mouth/Throat: Uvula is midline, oropharynx is clear and moist and mucous membranes are normal.   Eyes: Pupils are equal, round, and reactive to light. Conjunctivae and EOM are normal. No scleral icterus.   Neck: Normal range of motion. Neck supple.   Cardiovascular: Normal rate, regular rhythm, S1 normal, S2 normal, normal heart sounds, intact distal pulses and normal pulses. Exam reveals no gallop and no friction rub.   No murmur heard.  Pulses:       Radial pulses are 2+ on the right side, and 2+ on the left side.         Dorsalis pedis pulses are 2+ on the right side, and 2+ on the left side.        Posterior tibial pulses are 2+ on the right side, and 2+ on the left side.   Pulmonary/Chest: Effort normal and breath sounds normal. No respiratory distress. She has no wheezes. She has no rales. She exhibits no tenderness.   Abdominal: Soft. Bowel sounds are normal. She exhibits no distension and no mass. There is no tenderness. There is no rebound and no guarding. No hernia.   Musculoskeletal: Normal range of motion.   Neurological: She is alert and oriented to person, place, and time. GCS eye subscore is 4. GCS verbal subscore is 5. GCS motor subscore is 6.   Skin: Skin is warm and dry. Capillary refill takes less than 2 seconds. No rash noted. No erythema. No pallor.   Psychiatric: She has a normal mood and affect. Her behavior is normal. Judgment and thought content normal.   Nursing note and vitals reviewed.      Procedures           ED Course  ED Course as of Aug 06 2304   Thu Aug 06, 2020   2210 Awaiting COVID19 and respiratory panel    [AL]      ED Course User Index  [AL] Sendy Frank, NP      Xr Chest Ap    Result Date: 8/6/2020  No acute cardiopulmonary process.  Electronically Signed By-Melina Terry On:8/6/2020 8:48 PM This report was finalized on [id number] by  Melina Terry, .    Medications   sodium chloride 0.9 % flush 10 mL (has no administration in time range)   lactated ringers bolus 1,000 mL (0 mL Intravenous Stopped 8/6/20 2143)   albuterol sulfate HFA (PROVENTIL HFA;VENTOLIN HFA;PROAIR HFA) inhaler 2 puff (2 puffs Inhalation Given 8/6/20 2111)     Labs Reviewed   RESPIRATORY PANEL PCR W/ COVID-19 (SARS-COV-2) SATISH/ALEJANDRO/SHANA IN-HOUSE, NP SWAB IN UTM/VTP, 3-4 HR TAT - Abnormal; Notable for the following components:       Result Value    COVID19 Detected (*)     All other components within normal limits    Narrative:     Fact sheet for providers:  https://docs.[employer]/wp-content/uploads/BRD0576-6408-SR0.1-EUA-Provider-Fact-Sheet-3.pdf    Fact sheet for patients: https://docs.[employer]/wp-content/uploads/OZA7332-7583-BG6.1-EUA-Patient-Fact-Sheet-1.pdf   COMPREHENSIVE METABOLIC PANEL - Abnormal; Notable for the following components:    Glucose 119 (*)     ALT (SGPT) 44 (*)     Alkaline Phosphatase 126 (*)     All other components within normal limits    Narrative:     GFR Normal >60  Chronic Kidney Disease <60  Kidney Failure <15     CBC WITH AUTO DIFFERENTIAL - Abnormal; Notable for the following components:    Hemoglobin 11.1 (*)     Hematocrit 33.9 (*)     MCH 26.4 (*)     RDW 16.2 (*)     All other components within normal limits   FERRITIN - Normal    Narrative:     Results may be falsely decreased if patient taking Biotin.     TROPONIN (IN-HOUSE) - Normal    Narrative:     Troponin T Reference Range:  <= 0.03 ng/mL-   Negative for AMI  >0.03 ng/mL-     Abnormal for myocardial necrosis.  Clinicians would have to utilize clinical acumen, EKG, Troponin and serial changes to determine if it is an Acute Myocardial Infarction or myocardial injury due to an underlying chronic condition.       Results may be falsely decreased if patient taking Biotin.     BUN - Normal   POC LACTATE - Normal   BLOOD CULTURE   BLOOD CULTURE   POC LACTATE   CBC AND DIFFERENTIAL    Narrative:     The following orders were created for panel order CBC & Differential.  Procedure                               Abnormality         Status                     ---------                               -----------         ------                     CBC Auto Differential[[id number]]        Abnormal            Final result                 Please view results for these tests on the individual orders.   EXTRA TUBES    Narrative:     The following orders were created for panel order Extra Tubes.  Procedure                               Abnormality         Status                     ---------  "                              -----------         ------                     Light Blue Top[[id number]]                                   Final result               Gold Top - SST[[id number]]                                   Final result                 Please view results for these tests on the individual orders.   LIGHT BLUE TOP   GOLD TOP - [facility]                                          MDM  Number of Diagnoses or Management Options  Cough:   COVID-19:   Fever, unspecified fever cause:   Diagnosis management comments: Chart Review: 4/7/2020 patient was followed up by cardiology for A. fib and hypertension.  Comorbidity: Past Medical History:  No date: AF (paroxysmal atrial fibrillation) (CMS/[facility])  No date: Coronary artery disease  7/21/2017: Essential hypertension  3/23/2020: Gastroesophageal reflux disease  No date: GERD (gastroesophageal reflux disease)  3/23/2020: Paroxysmal atrial fibrillation (CMS/[facility])  3/23/2020: Sleep apnea  Imaging: Was interpreted by physician and reviewed by myself:    Patient undressed and placed in gown for exam.  Appropriate PPE worn during patient exam. 54-year-old female presents with multiple complaints; one-week history of: cough, dyspnea, melena, low-grade temp status post \"I took a pill last week and choked on it and it went into my lungs\".  Patient reports that her symptoms have progressively gotten worse over the last week.  She reports she called her primary care physician and was sent in for further work-up.  She also states that she has a history of A. fib is currently taking Xarelto.  IV established and labs obtained.  COVID19 protocol initiated.  Chest x-ray obtained with the above findings.  Hemoccult negative.  Labs essentially unremarkable, aside from COVID positive. Upon reassessment, she remains pink warm and dry no distress noted.  She reports some relief with albuterol. Patient was instructed to quarantine for 10 days from onset of symptoms, or 3 days fever free and " free of respiratory symptoms.  Patient verbalized understanding.  She is also encouraged to cough and her elbow and practice good hand hygiene.  She is also encouraged to push fluids.      Disposition/Treatment: Discussed results with patient, verbalized understanding.  Discussed reasons to return to the ER, patient verbalized understanding.  Agreeable with plan of care.  Patient was stable upon discharge.               Amount and/or Complexity of Data Reviewed  Clinical lab tests: reviewed  Tests in the medicine section of CPT®: reviewed    Patient Progress  Patient progress: stable      Final diagnoses:   COVID-19   Cough   Fever, unspecified fever cause            Sendy Frank, NP  08/06/20 2383

## 2020-08-07 ENCOUNTER — READMISSION MANAGEMENT (OUTPATIENT)
Dept: CALL CENTER | Facility: HOSPITAL | Age: 55
End: 2020-08-07

## 2020-08-07 LAB

## 2020-08-07 NOTE — DISCHARGE INSTRUCTIONS
As discussed, you will need to quarantine for 10 days after the onset of your symptoms, or 3 days after fever free and free of respiratory symptoms.  Use pulse ox as directed.  Push fluids to stay hydrated.  Cough into your elbow and practice good handwashing.  Wipe hard surfaces down with Lysol and/or bleach.  Follow-up with your primary care physician for recheck.  Return to the ER for any new or worsening symptoms, including but not limited to: Worsening shortness of breath, oxygen saturation of less than 94%.

## 2020-08-07 NOTE — NURSING NOTE
"Pt given water and shows no difficulty with swallowing. Pt repeatedly stated \"I'm fine, I just want to go home.\" Will continue to monitor.   "

## 2020-08-07 NOTE — OUTREACH NOTE
Medical Week 1 Survey      Responses   [facility] patient discharged from?  [facility]   COVID-19 Test Status  Confirmed   Does the patient have one of the following disease processes/diagnoses(primary or secondary)?  Other   Is there a successful TCM telephone encounter documented?  No   Week 1 attempt successful?  Yes   Call start time  1518   Call end time  1520   Discharge diagnosis  low grade fever,  cough,  Covid-19   Meds reviewed with patient/caregiver?  Yes   Is the patient having any side effects they believe may be caused by any medication additions or changes?  No   Does the patient have all medications ordered at discharge?  N/A   Is the patient taking all medications as directed (includes completed medication regime)?  Yes   Does the patient have a primary care provider?   Yes   Does the patient have an appointment with their PCP within 7 days of discharge?  No   What is preventing the patient from scheduling follow up appointments within 7 days of discharge?  Haven't had time   Nursing Interventions  Educated patient on importance of making appointment, Advised patient to make appointment   Has the patient kept scheduled appointments due by today?  N/A   Has home health visited the patient within 72 hours of discharge?  N/A   Pulse Ox monitoring  Intermittent   Pulse Ox device source  Hospital   O2 Sat comments  reports oxygen saturation is 99% and HR is 83   O2 Sat: education provided  Sat levels, When to seek care   Psychosocial issues?  No   Did the patient receive a copy of their discharge instructions?  Yes   Nursing interventions  Reviewed instructions with patient   What is the patient's perception of their health status since discharge?  Improving   Is the patient/caregiver able to teach back signs and symptoms related to disease process for when to call PCP?  Yes   Is the patient/caregiver able to teach back signs and symptoms related to disease process for when to call 911?  Yes   Is the  patient/caregiver able to teach back the hierarchy of who to call/visit for symptoms/problems? PCP, Specialist, Home health nurse, Urgent Care, ED, 911  Yes   Week 1 call completed?  Yes          Sebastian Galvan RN

## 2020-08-07 NOTE — OUTREACH NOTE
Prep Survey      Responses   [facility] patient discharged from?  [facility]   Is LACE score < 7 ?  No   Eligibility  Readm Mgmt   Discharge diagnosis  low grade fever,  cough,  Covid-19   COVID-19 Test Status  Confirmed   Does the patient have one of the following disease processes/diagnoses(primary or secondary)?  Other   Does the patient have Home health ordered?  No   Is there a DME ordered?  No   Prep survey completed?  Yes          Priscilla Elkins RN

## 2020-08-07 NOTE — NURSING NOTE
Pt given pulse ox and instructions on how to use. Education provided about symptoms and quarantine procedures as well.

## 2020-08-08 ENCOUNTER — READMISSION MANAGEMENT (OUTPATIENT)
Dept: CALL CENTER | Facility: HOSPITAL | Age: 55
End: 2020-08-08

## 2020-08-08 NOTE — OUTREACH NOTE
Medical Week 1 Survey      Responses   [facility] patient discharged from?  [facility]   COVID-19 Test Status  Confirmed   Does the patient have one of the following disease processes/diagnoses(primary or secondary)?  Other   Is there a successful TCM telephone encounter documented?  No   Week 1 attempt successful?  No          Sebastian Galvan RN   Telephone Encounter by Elizabeth Arnold LPN at 07/24/17 11:22 AM     Author:  Elizabeth Arnold LPN Service:  (none) Author Type:  Licensed Nurse     Filed:  07/24/17 11:22 AM Encounter Date:  7/22/2017 Status:  Signed     :  Elizabeth Arnold LPN (Licensed Nurse)            Patient notified that Dr. Hernandez authorized for her to resume her regular activities. Closing encounter.[MM1.1M]      Revision History        User Key Date/Time User Provider Type Action    > MM1.1 07/24/17 11:22 AM Elizabeth Arnold LPN Licensed Nurse Sign    M - Manual

## 2020-08-09 ENCOUNTER — READMISSION MANAGEMENT (OUTPATIENT)
Dept: CALL CENTER | Facility: HOSPITAL | Age: 55
End: 2020-08-09

## 2020-08-09 NOTE — OUTREACH NOTE
Medical Week 1 Survey      Responses   [facility] patient discharged from?  [facility]   COVID-19 Test Status  Confirmed   Does the patient have one of the following disease processes/diagnoses(primary or secondary)?  Other   Is there a successful TCM telephone encounter documented?  No   Week 1 attempt successful?  Yes   Call start time  1211   Call end time  1212   Discharge diagnosis  low grade fever,  cough,  Covid-19   Meds reviewed with patient/caregiver?  Yes   Is the patient having any side effects they believe may be caused by any medication additions or changes?  No   Does the patient have all medications ordered at discharge?  N/A   Is the patient taking all medications as directed (includes completed medication regime)?  Yes   Does the patient have a primary care provider?   Yes   Does the patient have an appointment with their PCP within 7 days of discharge?  No   What is preventing the patient from scheduling follow up appointments within 7 days of discharge?  Haven't had time   Nursing Interventions  Educated patient on importance of making appointment, Advised patient to make appointment   Has the patient kept scheduled appointments due by today?  N/A   Has home health visited the patient within 72 hours of discharge?  N/A   Pulse Ox monitoring  Intermittent   Pulse Ox device source  Hospital   O2 Sat comments  reports oxygen saturation are >97%.   O2 Sat: education provided  Sat levels, When to seek care   Psychosocial issues?  No   Did the patient receive a copy of their discharge instructions?  Yes   Nursing interventions  Reviewed instructions with patient   What is the patient's perception of their health status since discharge?  Improving   Is the patient/caregiver able to teach back signs and symptoms related to disease process for when to call PCP?  Yes   Is the patient/caregiver able to teach back signs and symptoms related to disease process for when to call 911?  Yes   Is the  patient/caregiver able to teach back the hierarchy of who to call/visit for symptoms/problems? PCP, Specialist, Home health nurse, Urgent Care, ED, 911  Yes   Week 1 call completed?  Yes   Graduated  Yes          Sebastian Galvan RN

## 2020-08-11 LAB
BACTERIA SPEC AEROBE CULT: NORMAL
BACTERIA SPEC AEROBE CULT: NORMAL

## 2020-10-19 ENCOUNTER — OFFICE VISIT (OUTPATIENT)
Dept: SLEEP MEDICINE | Facility: HOSPITAL | Age: 55
End: 2020-10-19

## 2020-10-19 VITALS
HEART RATE: 91 BPM | BODY MASS INDEX: 46.42 KG/M2 | WEIGHT: 278.6 LBS | OXYGEN SATURATION: 98 % | DIASTOLIC BLOOD PRESSURE: 71 MMHG | HEIGHT: 65 IN | SYSTOLIC BLOOD PRESSURE: 133 MMHG

## 2020-10-19 DIAGNOSIS — G47.33 OBSTRUCTIVE SLEEP APNEA, ADULT: Primary | ICD-10-CM

## 2020-10-19 PROCEDURE — G0463 HOSPITAL OUTPT CLINIC VISIT: HCPCS

## 2020-10-19 NOTE — PROGRESS NOTES
SLEEP MEDICINE CONSULT      Patient Identification:     Name: Bertha Peralta   Age: 54 y.o.   Gender: female   : 1965   MRN: [id number]     Date of consult: 10/19/2020    PCP/Referring Physician(s):     PCP: Betsy Pham MD  Referring Provider: Nathalie Le MD      Chief Complaint:   Obstructive sleep apnea.  Yearly follow-up for compliance.    History of Present Illness:   This is a very pleasant 54 years old  lady who is here today for yearly follow-up for compliance.  Memory card has been downloaded.    She uses a DreamWear full facemask small size.  She finds the mask quite comfortable .  no air leak did by her.  She finds a pressure quite comfortable.  She uses humidifier on regular basis.  She would like a new machine.  She uses a auto BiPAP mode of therapy with a pressure range between 12-18 and pressure support of 6.    Her usual bedtime is between 10 PM to 11 PM.  Usually dozes off within an hour or so on a good night. On bad nights ,she has remained awake for atleast 4 hours before dozing off..  She usually wakes up  in the morning  Between 7 am to 8 am..  She has woken up several times at night especially when she changes position.  Pain in neck and other joints wakes her up.  She has a pet cat in her bedroom which disturbs her sleep.  She describes herself a light sleeper easily awakened by noises.  She has woken up twice sometimes at night for a bathroom trip.  She has headache in the morning which is slowly decreasing in severity as well as intensity with the use of CBD oil.  She has severe cervical degenerative joint disease.  Occasionally has a dry mouth at night as well as in the morning.  Sinus congestion at night as well as in the morning.  She denies any symptoms of palpitations or heartburn at night or in the morning.    She wakes up in the morning somewhat awake and alert.  She remains active throughout the day.  Sometimes takes a nap during the daytime.  She tends  to doze off during sedentary activities.  Her Talbott sleepiness score is 8/24.    Allergies, Medications, and Past History:   Allergies:    No Known Allergies  Current Medications:    Prior to Admission medications    Medication Sig Start Date End Date Taking? Authorizing Provider   aspirin (ECOTRIN) 325 MG EC tablet Take 325 mg by mouth Daily.   Yes Sarah Waters MD   carbonyl iron (FEOSOL) 45 MG tablet tablet Take  by mouth Daily.   Yes Sarah Waters MD   cholecalciferol (VITAMIN D3) 1000 units tablet Take 1,000 Units by mouth Daily.   Yes Sarah Waters MD   dilTIAZem XR (DILT-XR) 180 MG 24 hr capsule Take 180 mg by mouth Daily.   Yes Sarah Waters MD   FLUoxetine (PROzac) 20 MG capsule Take 20 mg by mouth Daily.   Yes Sarah Waters MD   magnesium citrate 1.745 GM/30ML solution solution Take  by mouth 1 (One) Time.   Yes Sarah Waters MD   metFORMIN (GLUCOPHAGE) 500 MG tablet Take 500 mg by mouth 2 (Two) Times a Day With Meals.   Yes Sarah Waters MD   omeprazole (priLOSEC) 40 MG capsule Take 40 mg by mouth Daily.   Yes Sarah Waters MD   rivaroxaban (XARELTO) 20 MG tablet Take 20 mg by mouth Daily.   Yes Sarah Waters MD   sucralfate (CARAFATE) 1 g tablet Take 1 g by mouth 4 (Four) Times a Day.   Yes Sarah Waters MD   vitamin B-12 (CYANOCOBALAMIN) 500 MCG tablet Take 500 mcg by mouth Daily.   Yes Saarh Waters MD     Past Medical History:    Past Medical History:   Diagnosis Date   • AF (paroxysmal atrial fibrillation) (CMS/HCC)    • Coronary artery disease    • Essential hypertension 7/21/2017   • Gastroesophageal reflux disease 3/23/2020   • GERD (gastroesophageal reflux disease)    • Paroxysmal atrial fibrillation (CMS/HCC) 3/23/2020   • Sleep apnea 3/23/2020      Past Surgical History:    Past Surgical History:   Procedure Laterality Date   • BREAST BIOPSY     • CORONARY ARTERY BYPASS GRAFT     • HYSTERECTOMY       "  Social History:    Social History     Tobacco Use   • Smoking status: Never Smoker   • Smokeless tobacco: Never Used   Substance Use Topics   • Alcohol use: Not Currently   • Drug use: Never     Family Medical History:  Family History   Problem Relation Age of Onset   • Breast cancer Mother 35   • Breast cancer Maternal Grandmother          Review of Systems:   Constitutional:  Denies fever or chills and weight gain  Eyes:  Denies change in visual acuity   HENT:  nasal congestion, sore throat or post nasal drainage change of seasons.  Respiratory:  Denies cough, shortness of breath.  She has experienced  dyspnea on exertion    Cardiovascular:  Denies chest pain or edema   GI:  Denies abdominal pain, nausea, vomiting, bloody stools or diarrhea .  No aerophagia.  :  Denies dysuria or nocturia   Musculoskeletal: She has degenerative joint disease involving cervical as well as lumbosacral spine.  It results in severe headaches.  He takes CBD oil which has decreased her symptoms.    Integument:  Denies rash   Neurologic: Headaches, decreased in severity whith  CBD oil.  n focal weakness or sensory changes. No history of stroke or seizures.  Episodes of loss of consciousness preceded by dizziness and vertigo.  Endocrine:  Denies polyuria or polydipsia   Lymphatic:  Denies swollen glands   Psychiatric:  Denies depression, anxiety or claustrophobia      Physical Exam:     Vitals:    10/19/20 1516   BP: 133/71   Pulse: 91   SpO2: 98%   Weight: 126 kg (278 lb 9.6 oz)   Height: 165.1 cm (65\")     HEENT: Head is normocephalic, atraumatic, normal distribution of hair. Pupils reactive to light. Ocular movements intact.  Moderate nasal congestion on sniff test. No deviated nasal septum. No micrognathia.  Throat: Mallapatti stage 4, tongue midline, mucosa moist.  Neck: no JVD, thyromegaly, mass, +midline trachea, Neck short .  No lymphadenopathy.  Lungs: clear, no wheeze, rhonchi, crackles  Cardiovascular: S1, S2, RRR no " murmurs, rubs or gallops  Abd: +BS's soft NT/ND, no CVA tenderness.  Obese.   Ext: no edema, cyanosis, clubbing, pulses intact  Neuro: Awake alert, oriented to time place and person. Grossly intact to motor, sensory cerebral, and cerebellar (without focal deficit)  Psych: No overt kurt, depression, psychosis or inappropriate behavior  Skin: No rash, cellulitis, or ulcerations.  No facial rash or erosions    DIAGNOSTIC DATA  Memory download shows data from 10/20/2019 to 10/18/2020.  Overall 365 days of data reviewed.   Percentage of use 100%.  Maximum hours used 11 hours 29 minute.  Minimum time used 5 hours and 10minutes.  100% of the time the mask has been used for more than 4 hours.  The apnea hypopnea index is 1.8 events per hour on auto BiPAP mode of therapy with a pressure range between 12-18 and pressure support of 6.  Assessment/Plan:   Obstructive sleep apnea:  This is a very pleasant lady who underwent a nocturnal polysomnogram several years ago.  She presently uses an auto BiPAP mode of therapy.  Her equipment is quite old.  Her humidifier sometimes malfunctions.  She is compliant with therapy.  She is getting some benefit from it.    The results of memory card download were discussed in detail with the patient all questions were answered.  Recommendation.  Advised to continue using the mask with given pressures on a nightly basis.  She is advised to use the mask with given pressures for at least 4 hours a minimum benefit or as long as she sleeps a maximum benefit.  She is advised to use the mask with given pressures if she takes a nap during the daytime.  To change auto BiPAP mode of therapy to the range of 12-22.  Pressure support between 4 to 6 cwp.  Allergic rhinitis:  She remains symptomatic.  Recommendation.  Aggressive therapy is recommended.  Obesity:  Based on BMI of 46.36  Recommendation.  Weight loss is recommended.  Driving instructions. Patient is advised to avoid driving if tired or  somnolent. To avoid all alcoholic beverages or medications causing somnolence.         Diagnosis Plan   1. Obstructive sleep apnea, adult  CPAP Therapy     No orders of the defined types were placed in this encounter.    Orders Placed This Encounter   Procedures   • CPAP Therapy     Please change auto-BIPAP pressure range to 12 to 22 cwp.  Pressure support range 4 to 6 cwp.     Order Specific Question:   Equipment:     Answer:   PAP Supplies Only     Order Specific Question:   PAP Tubing (1 per 3 months)     Answer:    6' Standard tubing     Order Specific Question:   PAP Mask (1 per 3 months)     Answer:    Full face mask     Order Specific Question:   Mask interface (1 per month)     Answer:    Face Mask Interface     Order Specific Question:   PAP Accessories     Answer:    Water Chamber for PAP Humidifier (1 per 6 month) &  Filter PAP Disposable (2 per month) &  Filter PAP Non-Disposable (1 per 6 months) &  Chinstrap to be used with PAP (1 per 6 months) &  Headgear to be used with PAP (1 per 6 mo)     Order Specific Question:   Does the patient have Obstructive Sleep Apnea or other qualifying diagnosis for PAP ordered?     Answer:   Yes     Order Specific Question:   Does the patient require humidification?     Answer:   Yes     Order Specific Question:   If ordering a BiPAP for RUTHIE a CPAP has been tried and/or ruled out?     Answer:   Yes     Order Specific Question:   The patient requires a PAP Device & continued use of Supplies to administer effective PAP therapy at the frequency of use ordered above     Answer:   Yes     Order Specific Question:   Initial Supplies and refills authorized for 12 months?     Answer:   Yes     Order Specific Question:   Which [employer] company is this being sent to?     Answer:   [facility] [[id number]]     Order Specific Question:   Length of Need (99 Months = Lifetime)     Answer:   99 Months = Lifetime       This document has  been electronically signed by:  Nathalie Le MD  10/19/2020  16:01 EDT

## 2020-12-21 ENCOUNTER — TRANSCRIBE ORDERS (OUTPATIENT)
Dept: ADMINISTRATIVE | Facility: HOSPITAL | Age: 55
End: 2020-12-21

## 2020-12-21 DIAGNOSIS — Z12.31 ENCOUNTER FOR SCREENING MAMMOGRAM FOR MALIGNANT NEOPLASM OF BREAST: Primary | ICD-10-CM

## 2021-01-05 ENCOUNTER — HOSPITAL ENCOUNTER (OUTPATIENT)
Dept: MAMMOGRAPHY | Facility: HOSPITAL | Age: 56
Discharge: HOME | End: 2021-01-05
Admitting: OBSTETRICS & GYNECOLOGY

## 2021-01-05 DIAGNOSIS — Z12.31 ENCOUNTER FOR SCREENING MAMMOGRAM FOR MALIGNANT NEOPLASM OF BREAST: ICD-10-CM

## 2021-01-05 PROCEDURE — 77067 SCR MAMMO BI INCL CAD: CPT

## 2021-01-05 PROCEDURE — 77063 BREAST TOMOSYNTHESIS BI: CPT

## 2021-01-12 RX ORDER — DILTIAZEM HYDROCHLORIDE 180 MG/1
180 CAPSULE, EXTENDED RELEASE ORAL DAILY
Qty: 90 CAPSULE | Refills: 1 | Status: SHIPPED | OUTPATIENT
Start: 2021-01-12 | End: 2021-03-15 | Stop reason: SDUPTHER

## 2021-02-03 ENCOUNTER — OFFICE (AMBULATORY)
Dept: URBAN - METROPOLITAN AREA CLINIC 64 | Facility: CLINIC | Age: 56
End: 2021-02-03

## 2021-02-03 VITALS
HEIGHT: 65 IN | HEART RATE: 87 BPM | DIASTOLIC BLOOD PRESSURE: 61 MMHG | SYSTOLIC BLOOD PRESSURE: 143 MMHG | WEIGHT: 277 LBS

## 2021-02-03 DIAGNOSIS — R15.2 FECAL URGENCY: ICD-10-CM

## 2021-02-03 DIAGNOSIS — D50.9 IRON DEFICIENCY ANEMIA, UNSPECIFIED: ICD-10-CM

## 2021-02-03 DIAGNOSIS — K21.9 GASTRO-ESOPHAGEAL REFLUX DISEASE WITHOUT ESOPHAGITIS: ICD-10-CM

## 2021-02-03 PROCEDURE — 99214 OFFICE O/P EST MOD 30 MIN: CPT | Performed by: NURSE PRACTITIONER

## 2021-02-03 RX ORDER — SUCRALFATE 1 G/1
TABLET ORAL
Qty: 0 | Refills: 0 | Status: COMPLETED
End: 2023-02-01

## 2021-02-03 RX ORDER — OMEPRAZOLE 40 MG/1
CAPSULE, DELAYED RELEASE ORAL
Qty: 90 | Refills: 4 | Status: COMPLETED
End: 2021-09-17

## 2021-03-15 RX ORDER — DILTIAZEM HYDROCHLORIDE 180 MG/1
180 CAPSULE, EXTENDED RELEASE ORAL DAILY
Qty: 90 CAPSULE | Refills: 1 | Status: SHIPPED | OUTPATIENT
Start: 2021-03-15 | End: 2021-09-13

## 2021-03-15 NOTE — TELEPHONE ENCOUNTER
Patient called and stated that she needs a refill on her diltiazem xr 180 mg and she stated that the pharmacy told her that it had been denied.

## 2021-04-08 ENCOUNTER — OFFICE VISIT (OUTPATIENT)
Dept: CARDIOLOGY | Facility: CLINIC | Age: 56
End: 2021-04-08

## 2021-04-08 VITALS
OXYGEN SATURATION: 98 % | HEIGHT: 65 IN | WEIGHT: 277 LBS | DIASTOLIC BLOOD PRESSURE: 72 MMHG | SYSTOLIC BLOOD PRESSURE: 104 MMHG | HEART RATE: 76 BPM | BODY MASS INDEX: 46.15 KG/M2

## 2021-04-08 DIAGNOSIS — I10 ESSENTIAL HYPERTENSION: ICD-10-CM

## 2021-04-08 DIAGNOSIS — I48.0 PAROXYSMAL ATRIAL FIBRILLATION (HCC): Primary | ICD-10-CM

## 2021-04-08 PROCEDURE — 93000 ELECTROCARDIOGRAM COMPLETE: CPT | Performed by: INTERNAL MEDICINE

## 2021-04-08 PROCEDURE — 99213 OFFICE O/P EST LOW 20 MIN: CPT | Performed by: INTERNAL MEDICINE

## 2021-04-08 RX ORDER — ASPIRIN 81 MG/1
81 TABLET, CHEWABLE ORAL DAILY
COMMUNITY

## 2021-04-08 RX ORDER — CETIRIZINE HYDROCHLORIDE 10 MG/1
10 TABLET ORAL DAILY
COMMUNITY

## 2021-04-29 ENCOUNTER — OFFICE (AMBULATORY)
Dept: URBAN - METROPOLITAN AREA CLINIC 64 | Facility: CLINIC | Age: 56
End: 2021-04-29

## 2021-04-29 VITALS
SYSTOLIC BLOOD PRESSURE: 133 MMHG | HEART RATE: 87 BPM | WEIGHT: 275 LBS | HEIGHT: 65 IN | DIASTOLIC BLOOD PRESSURE: 74 MMHG

## 2021-04-29 DIAGNOSIS — K21.9 GASTRO-ESOPHAGEAL REFLUX DISEASE WITHOUT ESOPHAGITIS: ICD-10-CM

## 2021-04-29 DIAGNOSIS — R15.2 FECAL URGENCY: ICD-10-CM

## 2021-04-29 DIAGNOSIS — D50.9 IRON DEFICIENCY ANEMIA, UNSPECIFIED: ICD-10-CM

## 2021-04-29 PROCEDURE — 99213 OFFICE O/P EST LOW 20 MIN: CPT | Performed by: NURSE PRACTITIONER

## 2021-04-29 RX ORDER — COLESEVELAM HYDROCHLORIDE 625 MG/1
625 TABLET, FILM COATED ORAL
Qty: 30 | Refills: 11 | Status: COMPLETED
Start: 2021-04-29 | End: 2023-02-01

## 2021-04-29 RX ORDER — OMEPRAZOLE 40 MG/1
CAPSULE, DELAYED RELEASE ORAL
Qty: 90 | Refills: 4 | Status: COMPLETED
End: 2021-09-17

## 2021-05-13 ENCOUNTER — TELEPHONE (OUTPATIENT)
Dept: CARDIOLOGY | Facility: CLINIC | Age: 56
End: 2021-05-13

## 2021-07-15 RX ORDER — RIVAROXABAN 20 MG/1
TABLET, FILM COATED ORAL
Qty: 30 TABLET | Refills: 11 | Status: SHIPPED | OUTPATIENT
Start: 2021-07-15

## 2021-09-13 RX ORDER — DILTIAZEM HYDROCHLORIDE 180 MG/1
180 CAPSULE, EXTENDED RELEASE ORAL DAILY
Qty: 90 CAPSULE | Refills: 2 | Status: SHIPPED | OUTPATIENT
Start: 2021-09-13 | End: 2022-08-23

## 2021-09-17 ENCOUNTER — OFFICE (AMBULATORY)
Dept: URBAN - METROPOLITAN AREA CLINIC 64 | Facility: CLINIC | Age: 56
End: 2021-09-17

## 2021-09-17 VITALS
SYSTOLIC BLOOD PRESSURE: 133 MMHG | WEIGHT: 275 LBS | HEIGHT: 65 IN | HEART RATE: 88 BPM | DIASTOLIC BLOOD PRESSURE: 65 MMHG

## 2021-09-17 DIAGNOSIS — R15.9 FULL INCONTINENCE OF FECES: ICD-10-CM

## 2021-09-17 DIAGNOSIS — R11.2 NAUSEA WITH VOMITING, UNSPECIFIED: ICD-10-CM

## 2021-09-17 DIAGNOSIS — K21.9 GASTRO-ESOPHAGEAL REFLUX DISEASE WITHOUT ESOPHAGITIS: ICD-10-CM

## 2021-09-17 PROCEDURE — 99214 OFFICE O/P EST MOD 30 MIN: CPT | Performed by: NURSE PRACTITIONER

## 2021-09-17 RX ORDER — PANTOPRAZOLE SODIUM 40 MG/1
40 TABLET, DELAYED RELEASE ORAL
Qty: 90 | Refills: 3 | Status: COMPLETED
Start: 2021-09-17 | End: 2022-12-08

## 2021-09-17 RX ORDER — OMEPRAZOLE 40 MG/1
CAPSULE, DELAYED RELEASE ORAL
Qty: 90 | Refills: 4 | Status: COMPLETED
End: 2021-09-17

## 2021-09-17 RX ORDER — FERROUS SULFATE 7.5 MG/0.5
75 SYRINGE (EA) ORAL
Qty: 150 | Refills: 3 | Status: COMPLETED
Start: 2021-09-17 | End: 2022-12-08

## 2021-11-15 ENCOUNTER — TRANSCRIBE ORDERS (OUTPATIENT)
Dept: ADMINISTRATIVE | Facility: HOSPITAL | Age: 56
End: 2021-11-15

## 2021-11-15 DIAGNOSIS — N64.4 BREAST TENDERNESS IN FEMALE: Primary | ICD-10-CM

## 2021-12-03 ENCOUNTER — HOSPITAL ENCOUNTER (OUTPATIENT)
Dept: ULTRASOUND IMAGING | Facility: HOSPITAL | Age: 56
Discharge: HOME | End: 2021-12-03

## 2021-12-03 ENCOUNTER — HOSPITAL ENCOUNTER (OUTPATIENT)
Dept: MAMMOGRAPHY | Facility: HOSPITAL | Age: 56
Discharge: HOME | End: 2021-12-03

## 2021-12-03 DIAGNOSIS — N64.4 BREAST TENDERNESS IN FEMALE: ICD-10-CM

## 2021-12-03 PROCEDURE — 76642 ULTRASOUND BREAST LIMITED: CPT

## 2021-12-03 PROCEDURE — G0279 TOMOSYNTHESIS, MAMMO: HCPCS

## 2021-12-03 PROCEDURE — 77066 DX MAMMO INCL CAD BI: CPT

## 2021-12-28 ENCOUNTER — TRANSCRIBE ORDERS (OUTPATIENT)
Dept: ADMINISTRATIVE | Facility: HOSPITAL | Age: 56
End: 2021-12-28

## 2021-12-28 DIAGNOSIS — N95.1 SYMPTOMATIC MENOPAUSAL OR FEMALE CLIMACTERIC STATES: Primary | ICD-10-CM

## 2021-12-28 DIAGNOSIS — Z12.31 SCREENING MAMMOGRAM, ENCOUNTER FOR: ICD-10-CM

## 2022-01-11 ENCOUNTER — OFFICE VISIT (OUTPATIENT)
Dept: SLEEP MEDICINE | Facility: CLINIC | Age: 57
End: 2022-01-11

## 2022-01-11 VITALS
SYSTOLIC BLOOD PRESSURE: 136 MMHG | HEIGHT: 65 IN | HEART RATE: 82 BPM | DIASTOLIC BLOOD PRESSURE: 67 MMHG | WEIGHT: 277 LBS | OXYGEN SATURATION: 98 % | BODY MASS INDEX: 46.15 KG/M2

## 2022-01-11 DIAGNOSIS — G47.33 OSA TREATED WITH BIPAP: Primary | ICD-10-CM

## 2022-01-11 DIAGNOSIS — E66.01 CLASS 3 SEVERE OBESITY DUE TO EXCESS CALORIES WITHOUT SERIOUS COMORBIDITY WITH BODY MASS INDEX (BMI) OF 45.0 TO 49.9 IN ADULT: ICD-10-CM

## 2022-01-11 PROBLEM — E66.813 CLASS 3 SEVERE OBESITY DUE TO EXCESS CALORIES WITHOUT SERIOUS COMORBIDITY WITH BODY MASS INDEX (BMI) OF 45.0 TO 49.9 IN ADULT: Status: ACTIVE | Noted: 2022-01-11

## 2022-01-11 PROCEDURE — 99213 OFFICE O/P EST LOW 20 MIN: CPT | Performed by: INTERNAL MEDICINE

## 2022-01-11 PROCEDURE — G0463 HOSPITAL OUTPT CLINIC VISIT: HCPCS

## 2022-01-11 NOTE — PROGRESS NOTES
"  [address] [address]  Phone   Fax       SLEEP CLINIC FOLLOW UP PROGRESS NOTE.    Bertha Peralta  1965  56 y.o.  female      PCP: Betsy Pham MD      Date of visit: 1/11/2022    Chief Complaint   Patient presents with   • Sleep Apnea   • Obesity       HPI:  This is a 56 y.o. years old patient who has a history of obstructive sleep apnea is here for  the annual compliance follow-up.  Sleep apnea is mild in severity with a RDI 17.3/hr. Patient is using positive airway pressure therapy with BiPAP and the symptoms of snoring, non-restorative sleep and daytime excessive sleepiness have improved significantly on the therapy. Normally goes to bed at 11 PM and wakes up at 8 AM.  The patient wakes up 2 time(s) during the night and has no problem going back to sleep.  Feels refreshed after waking up.  Patient also denies headaches and nasal congestion.   She was a patient of Dr. Duffy and Dr Nathalie Le MD in the past.  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  She states that she uses BiPAP on a regular basis and without it he gets headaches and feels tired    Unfortunately we are unable to get a download as the smart card is corrupt    Medications and allergies are reviewed by me and documented in the encounter.     SOCIAL ( habits pertaining to sleep medicine)  History tobacco use:No   History of alcohol use: 0 per week  Caffeine use: 4     REVIEW OF SYSTEMS:   U[place]eda Sleepiness Scale :Total score: 16   Nasal congestion:No   Dry mouth/nose:Yes   Post nasal drip; No   Acid reflux/Heartburn:Yes   Abd bloating:No   Morning headache:No   Anxiety:No   Depression:No    PHYSICAL EXAMINATION:  CONSTITUTIONAL:  Vitals:    01/11/22 1146   BP: 136/67   BP Location: Left arm   Patient Position: Sitting   Cuff Size: Adult   Pulse: 82   SpO2: 98%   Weight: 126 kg (277 lb)   Height: 165.1 cm (65\")    Body mass index is 46.1 kg/m².   NOSE: " nasal passages are clear, no nasal polyps, septum in the midline.  THROAT: throat is clear, oral airway Mallampati class 3  RESP SYSTEM: Breath sounds are normal, no wheezes or crackles  CARDIOVASULAR: Heart rate is regular without murmur. No edema      Data reviewed:  Unable to download as the smart card is corrupt  Mask type: Full facemask  DME: Michael presents      ASSESSMENT AND PLAN:  · Obstructive sleep apnea ( G 47.33).  The symptoms of sleep apnea have improved with the device and the treatment.  Patient's compliance with the device is excellent for treatment of sleep apnea.  I have independently reviewed the smart card down load and discussed with the patient the download data and encouarged the patient to continue to use the device.The residual AHI is acceptable. The device is benefiting the patient and the device is medically necessary.  Without proper control of sleep apnea and good compliance there is a increased risk for hypertension, diabetes mellitus and nonrestorative sleep with hypersomnia which can increase risk for motor vehicle accidents.  Untreated sleep apnea is also a risk factor for development of atrial fibrillation, pulmonary hypertension and stroke. The patient is also instructed to get the supplies from the [facility] and and change them on a regular basis.  A prescription for supplies has been sent to the DME company.  I have also discussed the good sleep hygiene habits and adequate amount of sleep needed for good health.  Her BiPAP needs a new smartcard  · Obesity, class 3 with BMI is Body mass index is 46.1 kg/m².. I have discuss the relationship between the weight and sleep apnea. The benefit of weight loss in reducing severity of sleep apnea was discussed. Discussed diet and exercise with the patient to achieve ideal BMI.  · Migraine headache  · Return in about 1 year (around 1/11/2023) for Annual visit with smartcard download. . Patient's questions were answered.        Pauly ECHEVERRIA  MD [facility]  Sleep Medicine.  Medical Director, [facility], [facility]  1/11/2022 ,

## 2022-01-27 ENCOUNTER — APPOINTMENT (OUTPATIENT)
Dept: BONE DENSITY | Facility: HOSPITAL | Age: 57
End: 2022-01-27

## 2022-02-03 ENCOUNTER — APPOINTMENT (OUTPATIENT)
Dept: BONE DENSITY | Facility: HOSPITAL | Age: 57
End: 2022-02-03

## 2022-02-17 ENCOUNTER — HOSPITAL ENCOUNTER (OUTPATIENT)
Dept: BONE DENSITY | Facility: HOSPITAL | Age: 57
Discharge: HOME | End: 2022-02-17
Admitting: OBSTETRICS & GYNECOLOGY

## 2022-02-17 DIAGNOSIS — N95.1 SYMPTOMATIC MENOPAUSAL OR FEMALE CLIMACTERIC STATES: ICD-10-CM

## 2022-02-17 PROCEDURE — 77080 DXA BONE DENSITY AXIAL: CPT

## 2022-04-11 ENCOUNTER — OFFICE VISIT (OUTPATIENT)
Dept: CARDIOLOGY | Facility: CLINIC | Age: 57
End: 2022-04-11

## 2022-04-11 VITALS
BODY MASS INDEX: 45.15 KG/M2 | WEIGHT: 271 LBS | SYSTOLIC BLOOD PRESSURE: 146 MMHG | HEART RATE: 95 BPM | DIASTOLIC BLOOD PRESSURE: 86 MMHG | HEIGHT: 65 IN | RESPIRATION RATE: 18 BRPM

## 2022-04-11 DIAGNOSIS — I48.0 PAROXYSMAL ATRIAL FIBRILLATION: Primary | ICD-10-CM

## 2022-04-11 PROCEDURE — 93000 ELECTROCARDIOGRAM COMPLETE: CPT | Performed by: INTERNAL MEDICINE

## 2022-04-11 PROCEDURE — 99214 OFFICE O/P EST MOD 30 MIN: CPT | Performed by: INTERNAL MEDICINE

## 2022-04-11 RX ORDER — METOPROLOL SUCCINATE 25 MG/1
25 TABLET, EXTENDED RELEASE ORAL DAILY
Qty: 90 TABLET | Refills: 3 | Status: SHIPPED | OUTPATIENT
Start: 2022-04-11 | End: 2023-03-22

## 2022-04-11 NOTE — PROGRESS NOTES
Cardiology Clinic Note  Bull Barr MD, PhD    Subjective:     Encounter Date:04/11/2022      Patient ID: Bertha Peralta is a 56 y.o. female.    Chief Complaint:  Chief Complaint   Patient presents with   • Follow-up       HPI:    I the pleasure to see this very pleasant 56-year-old female today as a new patient for cardiology follow-up.  She has paroxysmal atrial fibrillation hypertension diabetes history of coronary disease, last stress echo was 2 years ago which revealed unremarkable findings, preserved EF with no new heart failure signs or symptoms no refractory angina her atrial fibrillation has been well controlled and she knows when she is having caroxazone says she can feel some tightness.  She has obesity at 270 pounds we discussed diet weight loss per AHA guidelines and healthy exercise allowed by functional status.  Her heart rate is slightly high at regular in the 90s sinus rhythm today and she says she otherwise feels good.    Historical data copied forward from previous encounters in EMR including the history, exam, and assessment/plan has been reviewed and is unchanged unless noted otherwise.    Cardiac medicines reviewed with risk, benefits, and necessity of each discussed.    Risk and benefit of cardiac testing reviewed including death heart attack stroke pain bleeding infection need for vascular /cardiovascular surgery were discussed and the patient     Review of systems negative x14 point review of systems except as mentioned above    Vitals reviewed  Obese soft nontender nondistended bowel sounds are positive  Clear to auscultation  Regular rate and rhythm 90s no rubs gallops heaves lift  Distant heart sounds  No carotid bruits or JVD  Radial pulses 2+ equal bilateral  Normal cap refill skin is warm and dry    Assessment  Assessment plan per my encounter    Paroxysmal A. fib  Continue diltiazem  Add metoprolol XL 25 daily  Aspirin presently low-dose  Continue Xarelto for stroke risk reduction  "with [id number] score of 3    Diabetes goal A1c less than 7    Morbid obesity BMI greater than 40  Diet and exercise per HI guidelines with goal of 10% body weight reduction over the next 1 year    Essential hypertension, controlled at home, advance metoprolol as above  Goal blood pressure with diabetes less than 1 30-1 35 systolic    Coronary equivalent of diabetes, goal LDL less than 100    Back to clinic 6 months to 1 year    Bull Barr MD, PhD    Objective:         /86 (BP Location: Left arm, Patient Position: Sitting)   Pulse 95   Resp 18   Ht 165.1 cm (65\")   Wt 123 kg (271 lb)   BMI 45.10 kg/m²           The pleasure to be involved in this patient's cardiovascular care.  Please call with any questions or concerns  Bull Barr MD, PhD    Most recent EKG as reviewed and interpreted by me:    ECG 12 Lead    Date/Time: 4/11/2022 5:05 PM  Performed by: Bull Barr MD  Authorized by: Bull Barr MD   Rhythm: sinus rhythm  Rate: normal  Conduction: conduction normal  ST Segments: ST segments normal  T Waves: T waves normal  QRS axis: normal  Comments: Low voltages in the precordial leads  Sinus rhythm  Nonspecific ST-T wave abnormalities             Most recent echo as reviewed and interpreted by me:      Most recent stress test as reviewed and interpreted by me:      Most recent cardiac catheterization as reviewed interpreted by me:  No results found for this or any previous visit.    The following portions of the patient's history were reviewed and updated as appropriate: allergies, current medications, past family history, past medical history, past social history, past surgical history and problem list.      ROS:  14 point review of systems negative except as mentioned above    Current Outpatient Medications:   •  albuterol (PROVENTIL) (2.5 MG/3ML) 0.083% nebulizer solution, As Needed., Disp: , Rfl:   •  aspirin 81 MG chewable tablet, Chew 81 mg Daily., Disp: , Rfl: "   •  cetirizine (zyrTEC) 10 MG tablet, Take 10 mg by mouth Daily., Disp: , Rfl:   •  cholecalciferol (VITAMIN D3) 1000 units tablet, Take 1,000 Units by mouth Daily., Disp: , Rfl:   •  cyanocobalamin 1000 MCG/ML injection, Every 30 (Thirty) Days., Disp: , Rfl:   •  dilTIAZem XR (DILACOR XR) 180 MG 24 hr capsule, TAKE 1 CAPSULE BY MOUTH DAILY, Disp: 90 capsule, Rfl: 2  •  FLUoxetine (PROzac) 20 MG capsule, Take 20 mg by mouth Daily., Disp: , Rfl:   •  FLUoxetine (PROzac) 40 MG capsule, 1 capsule Daily., Disp: , Rfl:   •  Magnesium 400 MG tablet, Daily., Disp: , Rfl:   •  metFORMIN (GLUCOPHAGE) 500 MG tablet, Take 500 mg by mouth 2 (Two) Times a Day With Meals., Disp: , Rfl:   •  omeprazole (priLOSEC) 40 MG capsule, Take 40 mg by mouth Daily., Disp: , Rfl:   •  sucralfate (CARAFATE) 1 g tablet, Take 1 g by mouth 4 (Four) Times a Day., Disp: , Rfl:   •  Xarelto 20 MG tablet, TAKE 1 TABLET BY MOUTH DAILY, Disp: 30 tablet, Rfl: 11  •  metoprolol succinate XL (TOPROL-XL) 25 MG 24 hr tablet, Take 1 tablet by mouth Daily., Disp: 90 tablet, Rfl: 3    Problem List:  Patient Active Problem List   Diagnosis   • Essential hypertension   • Depression   • Gastroesophageal reflux disease   • Paroxysmal atrial fibrillation (HCC)   • RUTHIE treated with BiPAP   • Astigmatism   • Hypermetropia   • Migraine   • Presbyopia   • Class 3 severe obesity due to excess calories without serious comorbidity with body mass index (BMI) of 45.0 to 49.9 in adult (HCC)     Past Medical History:  Past Medical History:   Diagnosis Date   • AF (paroxysmal atrial fibrillation) (HCC)    • Coronary artery disease    • Essential hypertension 7/21/2017   • Gastroesophageal reflux disease 3/23/2020   • GERD (gastroesophageal reflux disease)    • Paroxysmal atrial fibrillation (HCC) 3/23/2020   • Sleep apnea 3/23/2020     Past Surgical History:  Past Surgical History:   Procedure Laterality Date   • BREAST BIOPSY Right    • CORONARY ARTERY BYPASS GRAFT     •  HYSTERECTOMY       Social History:  Social History     Socioeconomic History   • Marital status:    Tobacco Use   • Smoking status: Never Smoker   • Smokeless tobacco: Never Used   Vaping Use   • Vaping Use: Never used   Substance and Sexual Activity   • Alcohol use: Not Currently   • Drug use: Never   • Sexual activity: Defer     Allergies:  No Known Allergies  Immunizations:    There is no immunization history on file for this patient.         In-Office Procedure(s):  No orders to display        ASCVD RIsk Score::  The ASCVD Risk score (Logan ERIKA Jr., et al., 2013) failed to calculate for the following reasons:    Cannot find a previous HDL lab    Cannot find a previous total cholesterol lab    Imaging:    Results for orders placed during the hospital encounter of 08/06/20    XR Chest AP    Narrative  Examination: XR CHEST AP-    Date of Exam: 8/6/2020 8:44 PM    Indication: COVID Evaluation, Cough, Fever.    Comparison: None available.    Technique: 1 view of the chest    Findings:  There are no airspace consolidations. No pleural effusions. No  pneumothorax. The heart size is normal. The pulmonary vasculature  appears within normal limits. No acute osseous abnormality identified.    Impression  No acute cardiopulmonary process.    Electronically Signed By-Melina Terry On:8/6/2020 8:48 PM  This report was finalized on [id number] by  Melina Terry, .       Results for orders placed during the hospital encounter of 12/03/21    US Breast Left Limited    Narrative  DATE OF EXAM:  12/3/2021 9:15 AM    PROCEDURE:  MAMMO DIAGNOSTIC DIGITAL TOMOSYNTHESIS BILATERAL W CAD-, US BREAST LEFT  LIMITED-    INDICATIONS:  Pain in the upper left breast, no right breast complaints.    COMPARISON:  Previous mammogram performed on 1/2/2020 and 1/5/2021.    TECHNIQUE:  Routine diagnostic views were obtained of both breasts. In addition, a  focused left breast ultrasound was performed.  Tomosynthesis was utilized for this  examination.    The mammographic images were interpreted with the assistance of a  Computer Aided Detection system.    FINDINGS:  These are scattered areas of fibroglandular density.    There are no suspicious masses, architectural distortion, or  microcalcifications within the right or left breast to indicate  malignancy. There is a stable biopsy clip in the upper outer quadrant of  the right breast.    Ultrasound scanning of the upper outer quadrant of the left breast in  the area of the patient's pain fails to demonstrate any sonographic  cystic or solid abnormalities. There is normal fatty and scant echogenic  fibroglandular tissue.    At this point, I would recommend clinical management of the patient's  left breast pain and routine mammographic follow-up in one year.    Impression  1. No mammographic findings to indicate right or left breast malignancy.  2. Negative left breast ultrasound.  3. I would recommend clinical management of the patient's left breast  pain and routine mammographic follow-up in one year.    ASSESSMENT:  BIRADS:  BI-RADS 1. Negative.    BI-RADS category Key:  Category 0-incomplete-needs additional imaging and/or prior images for  comparison.  Category 1-negative.  Category 2-benign findings.  Category 3-probably benign-short interval follow-up suggested.  Category 4-suspicious abnormality-biopsy should be considered.  Category 5-highly suggestive for malignancy-appropriate action should be  taken.  Category 6-known biopsy-proven malignancy-appropriate action should be  taken.    NOTE: There is at least a 15% false-negative rate and mammographic  detection of cancer. Therefore, management of a palpable abnormality  must be based on clinical grounds and a negative mammography report  should not defer further breast evaluation when clinically indicated.    The patient's information is been entered into a reminder system (MRS)  with a targeted due date for the next  mammogram.    Electronically Signed By-Jacobo Us MD On:12/3/2021 10:07 AM  This report was finalized on [id number] by  Jacobo Us MD.          Lab Review:   No visits with results within 6 Month(s) from this visit.   Latest known visit with results is:   Admission on 08/06/2020, Discharged on 08/06/2020   Component Date Value   • Glucose 08/06/2020 119 (A)   • BUN 08/06/2020     • Creatinine 08/06/2020 0.73    • Sodium 08/06/2020 140    • Potassium 08/06/2020 3.9    • Chloride 08/06/2020 103    • CO2 08/06/2020 24.0    • Calcium 08/06/2020 8.6    • Total Protein 08/06/2020 6.6    • Albumin 08/06/2020 3.80    • ALT (SGPT) 08/06/2020 44 (A)   • AST (SGOT) 08/06/2020 29    • Alkaline Phosphatase 08/06/2020 126 (A)   • Total Bilirubin 08/06/2020 0.2    • eGFR Non African Amer 08/06/2020 82    • Globulin 08/06/2020 2.8    • A/G Ratio 08/06/2020 1.4    • BUN/Creatinine Ratio 08/06/2020     • Anion Gap 08/06/2020 13.0    • Ferritin 08/06/2020 33.08    • WBC 08/06/2020 4.90    • RBC 08/06/2020 4.19    • Hemoglobin 08/06/2020 11.1 (A)   • Hematocrit 08/06/2020 33.9 (A)   • MCV 08/06/2020 80.9    • MCH 08/06/2020 26.4 (A)   • MCHC 08/06/2020 32.6    • RDW 08/06/2020 16.2 (A)   • RDW-SD 08/06/2020 46.4    • MPV 08/06/2020 6.6    • Platelets 08/06/2020 307    • Neutrophil % 08/06/2020 57.4    • Lymphocyte % 08/06/2020 33.4    • Monocyte % 08/06/2020 8.1    • Eosinophil % 08/06/2020 0.3    • Basophil % 08/06/2020 0.8    • Neutrophils, Absolute 08/06/2020 2.80    • Lymphocytes, Absolute 08/06/2020 1.60    • Monocytes, Absolute 08/06/2020 0.40    • Eosinophils, Absolute 08/06/2020 0.00    • Basophils, Absolute 08/06/2020 0.00    • nRBC 08/06/2020 0.1    • Troponin T 08/06/2020 <0.010    • Blood Culture 08/06/2020 No growth at 5 days    • Blood Culture 08/06/2020 No growth at 5 days    • Lactate 08/06/2020 1.8    • Extra Tube 08/06/2020 hold for add-on    • Extra Tube 08/06/2020 Hold for add-ons.    • ADENOVIRUS, PCR  08/06/2020 Not Detected    • Coronavirus 229E 08/06/2020 Not Detected    • Coronavirus HKU1 08/06/2020 Not Detected    • Coronavirus NL63 08/06/2020 Not Detected    • Coronavirus OC43 08/06/2020 Not Detected    • COVID19 08/06/2020 Detected (A)   • Human Metapneumovirus 08/06/2020 Not Detected    • Human Rhinovirus/Enterov* 08/06/2020 Not Detected    • Influenza A PCR 08/06/2020 Not Detected    • Influenza A H1 08/06/2020 Not Detected    • Influenza A H1 2009 PCR 08/06/2020 Not Detected    • Influenza A H3 08/06/2020 Not Detected    • Influenza B PCR 08/06/2020 Not Detected    • Parainfluenza Virus 1 08/06/2020 Not Detected    • Parainfluenza Virus 2 08/06/2020 Not Detected    • Parainfluenza Virus 3 08/06/2020 Not Detected    • Parainfluenza Virus 4 08/06/2020 Not Detected    • RSV, PCR 08/06/2020 Not Detected    • Bordetella pertussis pcr 08/06/2020 Not Detected    • Bordetella parapertussis* 08/06/2020 Not Detected    • Chlamydophila pneumoniae* 08/06/2020 Not Detected    • Mycoplasma pneumo by PCR 08/06/2020 Not Detected    • BUN 08/06/2020 11      Recent labs reviewed and interpreted for clinical significance and application            Level of Care:           Bull Barr MD  04/11/22  .

## 2022-08-23 RX ORDER — DILTIAZEM HYDROCHLORIDE 180 MG/1
180 CAPSULE, EXTENDED RELEASE ORAL DAILY
Qty: 90 CAPSULE | Refills: 2 | Status: SHIPPED | OUTPATIENT
Start: 2022-08-23

## 2022-12-08 ENCOUNTER — OFFICE (AMBULATORY)
Dept: URBAN - METROPOLITAN AREA CLINIC 64 | Facility: CLINIC | Age: 57
End: 2022-12-08
Payer: COMMERCIAL

## 2022-12-08 VITALS
SYSTOLIC BLOOD PRESSURE: 120 MMHG | HEIGHT: 65 IN | WEIGHT: 266 LBS | HEART RATE: 74 BPM | DIASTOLIC BLOOD PRESSURE: 70 MMHG

## 2022-12-08 DIAGNOSIS — R14.0 ABDOMINAL DISTENSION (GASEOUS): ICD-10-CM

## 2022-12-08 DIAGNOSIS — R15.2 FECAL URGENCY: ICD-10-CM

## 2022-12-08 DIAGNOSIS — K21.9 GASTRO-ESOPHAGEAL REFLUX DISEASE WITHOUT ESOPHAGITIS: ICD-10-CM

## 2022-12-08 DIAGNOSIS — D50.9 IRON DEFICIENCY ANEMIA, UNSPECIFIED: ICD-10-CM

## 2022-12-08 DIAGNOSIS — R13.10 DYSPHAGIA, UNSPECIFIED: ICD-10-CM

## 2022-12-08 DIAGNOSIS — K90.9 INTESTINAL MALABSORPTION, UNSPECIFIED: ICD-10-CM

## 2022-12-08 PROCEDURE — 99214 OFFICE O/P EST MOD 30 MIN: CPT | Performed by: NURSE PRACTITIONER

## 2022-12-08 RX ORDER — COLESEVELAM HYDROCHLORIDE 625 MG/1
625 TABLET, FILM COATED ORAL
Qty: 90 | Refills: 3 | Status: COMPLETED
Start: 2022-12-08 | End: 2023-02-01

## 2022-12-24 ENCOUNTER — APPOINTMENT (OUTPATIENT)
Dept: CARDIOLOGY | Facility: HOSPITAL | Age: 57
End: 2022-12-24

## 2022-12-24 ENCOUNTER — HOSPITAL ENCOUNTER (EMERGENCY)
Facility: HOSPITAL | Age: 57
Discharge: HOME | End: 2022-12-24
Attending: EMERGENCY MEDICINE | Admitting: EMERGENCY MEDICINE

## 2022-12-24 VITALS
TEMPERATURE: 97.4 F | BODY MASS INDEX: 44.48 KG/M2 | SYSTOLIC BLOOD PRESSURE: 126 MMHG | OXYGEN SATURATION: 97 % | HEIGHT: 65 IN | HEART RATE: 62 BPM | DIASTOLIC BLOOD PRESSURE: 63 MMHG | WEIGHT: 267 LBS | RESPIRATION RATE: 18 BRPM

## 2022-12-24 DIAGNOSIS — M79.602 LEFT ARM PAIN: ICD-10-CM

## 2022-12-24 DIAGNOSIS — I80.8 SUPERFICIAL THROMBOPHLEBITIS OF LEFT UPPER EXTREMITY: Primary | ICD-10-CM

## 2022-12-24 LAB
BH CV UPPER VENOUS LEFT AXILLARY AUGMENT: NORMAL
BH CV UPPER VENOUS LEFT AXILLARY COMPRESS: NORMAL
BH CV UPPER VENOUS LEFT AXILLARY PHASIC: NORMAL
BH CV UPPER VENOUS LEFT AXILLARY SPONT: NORMAL
BH CV UPPER VENOUS LEFT BASILIC FOREARM COMPRESS: NORMAL
BH CV UPPER VENOUS LEFT BASILIC UPPER COMPRESS: NORMAL
BH CV UPPER VENOUS LEFT BRACHIAL COMPRESS: NORMAL
BH CV UPPER VENOUS LEFT CEPHALIC FOREARM COMPRESS: NORMAL
BH CV UPPER VENOUS LEFT CEPHALIC UPPER COLOR: 1
BH CV UPPER VENOUS LEFT CEPHALIC UPPER COMPRESS: NORMAL
BH CV UPPER VENOUS LEFT CEPHALIC UPPER THROMBUS: NORMAL
BH CV UPPER VENOUS LEFT INTERNAL JUGULAR AUGMENT: NORMAL
BH CV UPPER VENOUS LEFT INTERNAL JUGULAR COMPRESS: NORMAL
BH CV UPPER VENOUS LEFT INTERNAL JUGULAR PHASIC: NORMAL
BH CV UPPER VENOUS LEFT INTERNAL JUGULAR SPONT: NORMAL
BH CV UPPER VENOUS LEFT RADIAL COMPRESS: NORMAL
BH CV UPPER VENOUS LEFT SUBCLAVIAN AUGMENT: NORMAL
BH CV UPPER VENOUS LEFT SUBCLAVIAN COMPRESS: NORMAL
BH CV UPPER VENOUS LEFT SUBCLAVIAN PHASIC: NORMAL
BH CV UPPER VENOUS LEFT SUBCLAVIAN SPONT: NORMAL
BH CV UPPER VENOUS LEFT ULNAR COMPRESS: NORMAL
BH CV UPPER VENOUS RIGHT INTERNAL JUGULAR AUGMENT: NORMAL
BH CV UPPER VENOUS RIGHT INTERNAL JUGULAR COMPRESS: NORMAL
BH CV UPPER VENOUS RIGHT INTERNAL JUGULAR PHASIC: NORMAL
BH CV UPPER VENOUS RIGHT INTERNAL JUGULAR SPONT: NORMAL
BH CV UPPER VENOUS RIGHT SUBCLAVIAN AUGMENT: NORMAL
BH CV UPPER VENOUS RIGHT SUBCLAVIAN COMPRESS: NORMAL
BH CV UPPER VENOUS RIGHT SUBCLAVIAN PHASIC: NORMAL
BH CV UPPER VENOUS RIGHT SUBCLAVIAN SPONT: NORMAL
BH CV VAS PRELIMINARY FINDINGS SCRIPTING: 1
MAXIMAL PREDICTED HEART RATE: 161 BPM
STRESS TARGET HR: 137 BPM

## 2022-12-24 PROCEDURE — 99283 EMERGENCY DEPT VISIT LOW MDM: CPT

## 2022-12-24 PROCEDURE — 93971 EXTREMITY STUDY: CPT

## 2022-12-24 RX ORDER — ACETAMINOPHEN 500 MG
1000 TABLET ORAL ONCE
Status: COMPLETED | OUTPATIENT
Start: 2022-12-24 | End: 2022-12-24

## 2022-12-24 RX ORDER — ACETAMINOPHEN AND CODEINE PHOSPHATE 300; 30 MG/1; MG/1
1 TABLET ORAL EVERY 6 HOURS PRN
Qty: 10 TABLET | Refills: 0 | Status: SHIPPED | OUTPATIENT
Start: 2022-12-24

## 2022-12-24 RX ADMIN — ACETAMINOPHEN 1000 MG: 500 TABLET ORAL at 12:39

## 2022-12-24 NOTE — ED PROVIDER NOTES
Subjective   History of Present Illness  Chief Complaint: Left arm pain, swelling    Patient is a 57-year-old  female history of paroxysmal A. fib, CAD, hypertension presents to the ER with complaints of left arm pain and swelling for 2 days.  Patient states that she had a PICC line placed 6 days ago for iron transfusions at [facility].  Patient states the PICC line was pulled 2 days ago.  Patient states since then she has had some redness and swelling to the area above her elbow on her left arm.  She describes pain as a constant aching pain that she rates an 8/10.  No numbness or tingling.  No issues with movement of her hand or fingers.  She denies any chest pain shortness of breath headache or fever or chills.  No history of PE or DVT.  No drainage or bleeding from the area.    Location: Left    Quality: Aching    Duration: 2 days    Timing: Constant    Severity: Mild to moderate    Associated Symptoms: None    PCP: Betsy Pham    History provided by:  Patient      Review of Systems   Constitutional: Negative for chills and fever.   HENT: Negative for sore throat and trouble swallowing.    Eyes: Negative.    Respiratory: Negative for shortness of breath and wheezing.    Cardiovascular: Negative for chest pain.   Gastrointestinal: Negative for abdominal pain, diarrhea, nausea and vomiting.   Endocrine: Negative.    Genitourinary: Negative for dysuria.   Musculoskeletal: Negative for myalgias.        Left arm pain, swelling   Skin: Positive for color change. Negative for rash.   Allergic/Immunologic: Negative.    Neurological: Negative for headaches.   Psychiatric/Behavioral: Negative for behavioral problems.   All other systems reviewed and are negative.      Past Medical History:   Diagnosis Date   • AF (paroxysmal atrial fibrillation) (HCC)    • Coronary artery disease    • Essential hypertension 7/21/2017   • Gastroesophageal reflux disease 3/23/2020   • GERD (gastroesophageal reflux disease)    •  Paroxysmal atrial fibrillation (HCC) 3/23/2020   • Sleep apnea 3/23/2020       No Known Allergies    Past Surgical History:   Procedure Laterality Date   • BREAST BIOPSY Right    • CORONARY ARTERY BYPASS GRAFT     • HYSTERECTOMY         Family History   Problem Relation Age of Onset   • Breast cancer Mother 35   • Breast cancer Maternal Grandmother        Social History     Socioeconomic History   • Marital status:    Tobacco Use   • Smoking status: Never   • Smokeless tobacco: Never   Vaping Use   • Vaping Use: Never used   Substance and Sexual Activity   • Alcohol use: Not Currently   • Drug use: Never   • Sexual activity: Defer           Objective   Physical Exam  Vitals and nursing note reviewed.   Constitutional:       Appearance: Normal appearance. She is well-developed. She is obese. She is not ill-appearing or toxic-appearing.   HENT:      Head: Normocephalic and atraumatic.      Nose: Nose normal.      Mouth/Throat:      Mouth: Mucous membranes are moist.   Eyes:      Pupils: Pupils are equal, round, and reactive to light.   Cardiovascular:      Rate and Rhythm: Normal rate and regular rhythm.      Pulses: Normal pulses.      Heart sounds: Normal heart sounds. No murmur heard.  Pulmonary:      Effort: Pulmonary effort is normal. No respiratory distress.      Breath sounds: Normal breath sounds. No wheezing.   Abdominal:      General: Bowel sounds are normal. There is no distension.      Palpations: Abdomen is soft.      Tenderness: There is no abdominal tenderness. There is no right CVA tenderness or left CVA tenderness.   Musculoskeletal:         General: Swelling and tenderness present. No deformity.      Right lower leg: No edema.      Left lower leg: No edema.      Comments: Tenderness to palpation in the left before meals and proximal left upper arm with some erythema and warmth.    Radial pulses present 2+ bilateral the sensation of soft touch intact full range of motion of all digits on the  "left hand and wrist    No central fluctuance or obvious evidence of abscess       Skin:     General: Skin is warm and dry.      Capillary Refill: Capillary refill takes less than 2 seconds.      Findings: Erythema present. No rash.   Neurological:      General: No focal deficit present.      Mental Status: She is alert and oriented to person, place, and time.   Psychiatric:         Mood and Affect: Mood normal.         Behavior: Behavior normal.         Procedures           ED Course  ED Course as of 12/24/22 2034   Sat Dec 24, 2022   1243 Per ankur Tariq, SVT left cephalic [MC]      ED Course User Index  [MC] Robyn Hernández PA    /63   Pulse 62   Temp 97.4 °F (36.3 °C) (Oral)   Resp 18   Ht 165.1 cm (65\")   Wt 121 kg (267 lb)   SpO2 97%   BMI 44.43 kg/m²   Labs Reviewed - No data to display  Medications   acetaminophen (TYLENOL) tablet 1,000 mg (1,000 mg Oral Given 12/24/22 1239)     No radiology results for the last day                                         MDM  Number of Diagnoses or Management Options  Left arm pain  Superficial thrombophlebitis of left upper extremity  Diagnosis management comments: MEDICAL DECISION  Epic Chart Review: No recent admissions  Comorbidities: A. fib, CAD, pretension, GERD,  Differentials: DVT, SVT, cellulitis; this list is not all inclusive and does not constitute the entirety of considered causes  Radiology interpretation:  Images reviewed by me and interpreted by radiologist, as above  Lab interpretation:  Labs viewed by me significant for, not warranted    While in the ED appropriate PPE was worn during exam and throughout all encounters with the patient.  Patient had the above evaluation.  Given patient's history Dopplers ordered to rule out DVT versus SVT.  Patient was given Tylenol for pain, she currently is on Xarelto for paroxysmal A. fib.  I personally spoke with the vascular tech, Coreg, Doppler negative for DVT.  Positive for SVT of the left " cephalic.  Findings discussed with patient at bedside.  Recommended warm compresses, pain medication and follow-up with PCP.  Patient requested Motrin prior to discharge, I advised patient that it would be better to abstain from this given she is currently taking Xarelto.  Offered tramadol, patient refused.  Will discharge patient with prescription for Tylenol #3.  Patient given informational handout regarding SVT, all questions answered.  Patient recommended to follow-up with primary care for recheck and she was given strict return precautions.  She again denied any chest pain or shortness of breath.  Vital signs are stable.  SPO2 97% on room air with a heart rate of 62.    Discharge plan and instructions were discussed with the patient who verbalized understanding and is in agreement with the plan, all questions were answered at this time.  Patient is aware of signs symptoms that would require immediate return to the emergency room.  Patient understands importance of following up with primary care provider for further evaluation and worsening concerns as well as blood pressure recheck in the next 4 weeks.    Patient was discharged in improved stable condition with an upright steady gait.         Amount and/or Complexity of Data Reviewed  Tests in the radiology section of CPT®: ordered and reviewed    Patient Progress  Patient progress: stable      Final diagnoses:   Superficial thrombophlebitis of left upper extremity   Left arm pain       ED Disposition  ED Disposition     ED Disposition   Discharge    Condition   Stable    Comment   --             Betsy Pham MD  [address] [address]  [phone]    Schedule an appointment as soon as possible for a visit in 2 days  As needed, If symptoms worsen         Medication List      New Prescriptions    acetaminophen-codeine 300-30 MG per tablet  Commonly known as: TYLENOL #3  Take 1 tablet by mouth Every 6 (Six) Hours As Needed for  Moderate Pain.           Where to Get Your Medications      These medications were sent to [id number] - [place], [place] - [address] AT [facility] OF [address] - [phone]  - [phone] FX  [address] [place] [address]    Phone: [phone]   · acetaminophen-codeine 300-30 MG per tablet          Robyn Hernández PA  12/24/22 2034

## 2022-12-24 NOTE — DISCHARGE INSTRUCTIONS
Tylenol 3 as needed for moderate to severe pain.  Avoid NSAIDs, ibuprofen, Motrin, Advil as you are currently on Xarelto    Do not take regular Tylenol in addition to the prescription Tylenol, only take one of them    Warm compress to the left arm may apply 15 to 20-minute increments    Follow-up with primary care for recheck  Return to the ER for new or worsening symptoms

## 2022-12-24 NOTE — ED NOTES
Pt states on Thursday she had a PICC line removed on her L arm. She had the PICC due to iron infusions. Patient states the PICC was hurting her while it was in and once they removed it the pain did not go away and now area where PICC was is red. Pulses are intact, +2 and patient is able to move arm but is painful to do so.

## 2023-02-02 ENCOUNTER — ON CAMPUS - OUTPATIENT (AMBULATORY)
Dept: URBAN - METROPOLITAN AREA HOSPITAL 2 | Facility: HOSPITAL | Age: 58
End: 2023-02-02

## 2023-02-02 VITALS
HEART RATE: 69 BPM | SYSTOLIC BLOOD PRESSURE: 134 MMHG | DIASTOLIC BLOOD PRESSURE: 85 MMHG | DIASTOLIC BLOOD PRESSURE: 103 MMHG | TEMPERATURE: 96.8 F | SYSTOLIC BLOOD PRESSURE: 127 MMHG | OXYGEN SATURATION: 100 % | SYSTOLIC BLOOD PRESSURE: 141 MMHG | OXYGEN SATURATION: 97 % | WEIGHT: 265 LBS | RESPIRATION RATE: 16 BRPM | DIASTOLIC BLOOD PRESSURE: 110 MMHG | HEART RATE: 76 BPM | DIASTOLIC BLOOD PRESSURE: 90 MMHG | HEART RATE: 78 BPM | RESPIRATION RATE: 18 BRPM | HEART RATE: 73 BPM | SYSTOLIC BLOOD PRESSURE: 152 MMHG | DIASTOLIC BLOOD PRESSURE: 86 MMHG | HEART RATE: 70 BPM | HEIGHT: 65 IN | OXYGEN SATURATION: 96 %

## 2023-02-02 DIAGNOSIS — R13.10 DYSPHAGIA, UNSPECIFIED: ICD-10-CM

## 2023-02-02 DIAGNOSIS — Q39.4 ESOPHAGEAL WEB: ICD-10-CM

## 2023-02-02 DIAGNOSIS — T18.128A FOOD IN ESOPHAGUS CAUSING OTHER INJURY, INITIAL ENCOUNTER: ICD-10-CM

## 2023-02-02 DIAGNOSIS — Z98.84 BARIATRIC SURGERY STATUS: ICD-10-CM

## 2023-02-02 PROBLEM — Z48.815 ENCOUNTER FOR SURGICAL AFTERCARE FOLLOWING SURGERY ON THE DI: Status: ACTIVE | Noted: 2023-02-02

## 2023-02-02 PROCEDURE — 43235 EGD DIAGNOSTIC BRUSH WASH: CPT | Performed by: INTERNAL MEDICINE

## 2023-02-02 PROCEDURE — 43450 DILATE ESOPHAGUS 1/MULT PASS: CPT | Performed by: INTERNAL MEDICINE

## 2023-03-22 RX ORDER — METOPROLOL SUCCINATE 25 MG/1
25 TABLET, EXTENDED RELEASE ORAL DAILY
Qty: 90 TABLET | Refills: 3 | Status: SHIPPED | OUTPATIENT
Start: 2023-03-22

## 2023-03-31 ENCOUNTER — OFFICE (AMBULATORY)
Dept: URBAN - METROPOLITAN AREA CLINIC 64 | Facility: CLINIC | Age: 58
End: 2023-03-31

## 2023-03-31 VITALS
SYSTOLIC BLOOD PRESSURE: 111 MMHG | WEIGHT: 261 LBS | HEART RATE: 66 BPM | HEIGHT: 65 IN | DIASTOLIC BLOOD PRESSURE: 61 MMHG

## 2023-03-31 DIAGNOSIS — Q39.4 ESOPHAGEAL WEB: ICD-10-CM

## 2023-03-31 DIAGNOSIS — R19.7 DIARRHEA, UNSPECIFIED: ICD-10-CM

## 2023-03-31 DIAGNOSIS — K21.9 GASTRO-ESOPHAGEAL REFLUX DISEASE WITHOUT ESOPHAGITIS: ICD-10-CM

## 2023-03-31 DIAGNOSIS — D50.9 IRON DEFICIENCY ANEMIA, UNSPECIFIED: ICD-10-CM

## 2023-03-31 DIAGNOSIS — R13.10 DYSPHAGIA, UNSPECIFIED: ICD-10-CM

## 2023-03-31 PROCEDURE — 99214 OFFICE O/P EST MOD 30 MIN: CPT | Performed by: NURSE PRACTITIONER

## 2023-03-31 RX ORDER — METOCLOPRAMIDE HYDROCHLORIDE 10 MG/1
20 TABLET ORAL
Qty: 180 | Refills: 3 | Status: COMPLETED
Start: 2023-03-31 | End: 2024-02-26

## 2023-05-10 ENCOUNTER — OFFICE VISIT (OUTPATIENT)
Dept: CARDIOLOGY | Facility: CLINIC | Age: 58
End: 2023-05-10
Payer: COMMERCIAL

## 2023-05-10 VITALS
HEART RATE: 70 BPM | BODY MASS INDEX: 42.99 KG/M2 | DIASTOLIC BLOOD PRESSURE: 71 MMHG | WEIGHT: 258 LBS | SYSTOLIC BLOOD PRESSURE: 105 MMHG | RESPIRATION RATE: 18 BRPM | HEIGHT: 65 IN

## 2023-05-10 DIAGNOSIS — I48.0 PAROXYSMAL A-FIB: Primary | ICD-10-CM

## 2023-05-10 RX ORDER — CLONIDINE HYDROCHLORIDE 0.1 MG/1
TABLET ORAL DAILY
COMMUNITY
Start: 2023-05-08

## 2023-05-10 RX ORDER — SEMAGLUTIDE 1.34 MG/ML
0.5 INJECTION, SOLUTION SUBCUTANEOUS WEEKLY
COMMUNITY
Start: 2023-03-08

## 2023-05-10 RX ORDER — CYCLOBENZAPRINE HCL 10 MG
10 TABLET ORAL 2 TIMES DAILY
COMMUNITY
Start: 2023-03-22

## 2023-05-10 RX ORDER — TOLTERODINE TARTRATE 2 MG/1
1 TABLET, EXTENDED RELEASE ORAL DAILY
COMMUNITY
Start: 2023-01-16

## 2023-05-10 RX ORDER — DAPAGLIFLOZIN 5 MG/1
1 TABLET, FILM COATED ORAL DAILY
COMMUNITY
Start: 2023-03-22

## 2023-05-10 RX ORDER — METOCLOPRAMIDE 10 MG/1
1 TABLET ORAL EVERY 12 HOURS SCHEDULED
COMMUNITY
Start: 2023-03-31

## 2023-05-10 RX ORDER — COLESEVELAM 180 1/1
1 TABLET ORAL DAILY
COMMUNITY
Start: 2023-03-23

## 2023-05-10 RX ORDER — PANTOPRAZOLE SODIUM 40 MG/1
40 TABLET, DELAYED RELEASE ORAL EVERY MORNING
COMMUNITY
Start: 2023-04-06

## 2023-05-18 NOTE — PROGRESS NOTES
Cardiology Clinic Note  Bull Barr MD, PhD    Subjective:     Encounter Date:05/10/2023      Patient ID: Bertha Peralta is a 57 y.o. female.    Chief Complaint:  Chief Complaint   Patient presents with   • Follow-up       HPI:    I the pleasure to see this very pleasant 57-year-old female today as a new patient for cardiology follow-up.  She has paroxysmal atrial fibrillation hypertension diabetes history of coronary disease, last stress echo was 2 years ago which revealed unremarkable findings, preserved EF with no new heart failure signs or symptoms no refractory angina her atrial fibrillation has been well controlled and she knows when she is having caroxazone says she can feel some tightness.  She has obesity at 270 pounds on last encounter down  258 now, we discussed diet weight loss per AHA guidelines and healthy exercise allowed by functional status which she is doing well..    EKG demonstrates sinus rhythm today no new symptoms today      Review of systems otherwise negative x14 point review of systems except as mentioned above     Historical data copied forward from previous encounters in EMR including the history, exam, and assessment/plan has been reviewed and is unchanged unless noted otherwise.     Cardiac medicines reviewed with risk, benefits, and necessity of each discussed.     Risk and benefit of cardiac testing reviewed including death heart attack stroke pain bleeding infection need for vascular /cardiovascular surgery were discussed and the patient      Review of systems negative x14 point review of systems except as mentioned above     Vitals reviewed  Weight down to 258 from 271  105/71 with heart rate to 70  Obese soft nontender nondistended bowel sounds are positive  Clear to auscultation  Regular rate and rhythm 90s no rubs gallops heaves lift  Distant heart sounds  No carotid bruits or JVD  Radial pulses 2+ equal bilateral  Normal cap refill skin is warm and dry  Unchanged from  "encounter     Assessment  Assessment plan per my encounter     Paroxysmal A. fib  Continue diltiazem  Continue metoprolol XL 25 daily  Aspirin presently low-dose  Continue Xarelto for stroke risk reduction with [id number] score of 3    Continue Farxiga  Clonidine 0.1/day as needed for hypertension     Diabetes goal A1c less than 7     Morbid obesity BMI greater than 40  Diet and exercise per HI guidelines with goal of 10% body weight reduction over the next 1 year     Essential hypertension, controlled at home, advance metoprolol as above  Goal blood pressure with diabetes less than 1 30-1 35 systolic     Coronary equivalent of diabetes, goal LDL less than 100 optimally less than 70  Recheck fasting lipid       Back to clinic 6 months to 1 year     Bull Barr MD, PhD      Objective:         /71 (BP Location: Left arm, Patient Position: Sitting)   Pulse 70   Resp 18   Ht 165.1 cm (65\")   Wt 117 kg (258 lb)   BMI 42.93 kg/m²       The pleasure to be involved in this patient's cardiovascular care.  Please call with any questions or concerns  Bull Barr MD, PhD    Most recent EKG as reviewed and interpreted by me:    ECG 12 Lead    Date/Time: 5/18/2023 3:18 PM  Performed by: Bull Barr MD  Authorized by: Bull Barr MD   Comparison: not compared with previous ECG   Previous ECG: no previous ECG available  Rhythm: sinus rhythm  Rate: normal  Conduction: conduction normal  QRS axis: normal  Other findings: non-specific ST-T wave changes    Clinical impression: abnormal EKG             Most recent echo as reviewed and interpreted by me:      Most recent stress test as reviewed and interpreted by me:      Most recent cardiac catheterization as reviewed interpreted by me:  No results found for this or any previous visit.    The following portions of the patient's history were reviewed and updated as appropriate: allergies, current medications, past family history, past medical " history, past social history, past surgical history and problem list.      ROS:  14 point review of systems negative except as mentioned above    Current Outpatient Medications:   •  acetaminophen-codeine (TYLENOL #3) 300-30 MG per tablet, Take 1 tablet by mouth Every 6 (Six) Hours As Needed for Moderate Pain., Disp: 10 tablet, Rfl: 0  •  albuterol (PROVENTIL) (2.5 MG/3ML) 0.083% nebulizer solution, As Needed., Disp: , Rfl:   •  aspirin 81 MG chewable tablet, Chew 1 tablet Daily., Disp: , Rfl:   •  cetirizine (zyrTEC) 10 MG tablet, Take 1 tablet by mouth Daily., Disp: , Rfl:   •  cholecalciferol (VITAMIN D3) 1000 units tablet, Take 1 tablet by mouth Daily., Disp: , Rfl:   •  cyanocobalamin 1000 MCG/ML injection, Every 30 (Thirty) Days., Disp: , Rfl:   •  dilTIAZem XR (DILACOR XR) 180 MG 24 hr capsule, TAKE 1 CAPSULE BY MOUTH DAILY, Disp: 90 capsule, Rfl: 2  •  FLUoxetine (PROzac) 20 MG capsule, Take 1 capsule by mouth Daily., Disp: , Rfl:   •  FLUoxetine (PROzac) 40 MG capsule, 1 capsule Daily., Disp: , Rfl:   •  metFORMIN (GLUCOPHAGE) 500 MG tablet, Take 1 tablet by mouth 2 (Two) Times a Day With Meals., Disp: , Rfl:   •  metoprolol succinate XL (TOPROL-XL) 25 MG 24 hr tablet, TAKE 1 TABLET BY MOUTH DAILY, Disp: 90 tablet, Rfl: 3  •  omeprazole (priLOSEC) 40 MG capsule, Take 1 capsule by mouth Daily., Disp: , Rfl:   •  Ozempic, 0.25 or 0.5 MG/DOSE, 2 MG/1.5ML solution pen-injector, Inject 0.5 mg under the skin into the appropriate area as directed 1 (One) Time Per Week., Disp: , Rfl:   •  Xarelto 20 MG tablet, TAKE 1 TABLET BY MOUTH DAILY, Disp: 30 tablet, Rfl: 11  •  cloNIDine (CATAPRES) 0.1 MG tablet, Daily., Disp: , Rfl:   •  colesevelam (WELCHOL) 625 MG tablet, Take 1 tablet by mouth Daily., Disp: , Rfl:   •  cyclobenzaprine (FLEXERIL) 10 MG tablet, Take 1 tablet by mouth 2 (Two) Times a Day., Disp: , Rfl:   •  Farxiga 5 MG tablet tablet, Take 1 tablet by mouth Daily., Disp: , Rfl:   •  metoclopramide (REGLAN)  10 MG tablet, Take 1 tablet by mouth Every 12 (Twelve) Hours., Disp: , Rfl:   •  pantoprazole (PROTONIX) 40 MG EC tablet, Take 1 tablet by mouth Every Morning., Disp: , Rfl:   •  Potassium 99 MG tablet, Take  by mouth Daily., Disp: , Rfl:   •  tolterodine (DETROL) 2 MG tablet, Take 1 tablet by mouth Daily., Disp: , Rfl:     Problem List:  Patient Active Problem List   Diagnosis   • Essential hypertension   • Depression   • Gastroesophageal reflux disease   • Paroxysmal atrial fibrillation   • RUTHIE treated with BiPAP   • Astigmatism   • Hypermetropia   • Migraine   • Presbyopia   • Class 3 severe obesity due to excess calories without serious comorbidity with body mass index (BMI) of 45.0 to 49.9 in adult     Past Medical History:  Past Medical History:   Diagnosis Date   • AF (paroxysmal atrial fibrillation)    • Coronary artery disease    • Essential hypertension 7/21/2017   • Gastroesophageal reflux disease 3/23/2020   • GERD (gastroesophageal reflux disease)    • Paroxysmal atrial fibrillation 3/23/2020   • Sleep apnea 3/23/2020     Past Surgical History:  Past Surgical History:   Procedure Laterality Date   • BREAST BIOPSY Right    • CORONARY ARTERY BYPASS GRAFT     • HYSTERECTOMY       Social History:  Social History     Socioeconomic History   • Marital status:    Tobacco Use   • Smoking status: Never   • Smokeless tobacco: Never   Vaping Use   • Vaping Use: Never used   Substance and Sexual Activity   • Alcohol use: Not Currently   • Drug use: Never   • Sexual activity: Defer     Allergies:  No Known Allergies  Immunizations:    There is no immunization history on file for this patient.         In-Office Procedure(s):  No orders to display        ASCVD RIsk Score::  The ASCVD Risk score ([place] DK, et al., 2019) failed to calculate for the following reasons:    Cannot find a previous HDL lab    Cannot find a previous total cholesterol lab    Imaging:    Results for orders placed during the hospital  encounter of 08/06/20    XR Chest AP    Narrative  Examination: XR CHEST AP-    Date of Exam: 8/6/2020 8:44 PM    Indication: COVID Evaluation, Cough, Fever.    Comparison: None available.    Technique: 1 view of the chest    Findings:  There are no airspace consolidations. No pleural effusions. No  pneumothorax. The heart size is normal. The pulmonary vasculature  appears within normal limits. No acute osseous abnormality identified.    Impression  No acute cardiopulmonary process.    Electronically Signed By-Melina Terry On:8/6/2020 8:48 PM  This report was finalized on [id number] by  Melina Terry, .       Results for orders placed during the hospital encounter of 12/03/21    US Breast Left Limited    Narrative  DATE OF EXAM:  12/3/2021 9:15 AM    PROCEDURE:  MAMMO DIAGNOSTIC DIGITAL TOMOSYNTHESIS BILATERAL W CAD-, US BREAST LEFT  LIMITED-    INDICATIONS:  Pain in the upper left breast, no right breast complaints.    COMPARISON:  Previous mammogram performed on 1/2/2020 and 1/5/2021.    TECHNIQUE:  Routine diagnostic views were obtained of both breasts. In addition, a  focused left breast ultrasound was performed.  Tomosynthesis was utilized for this examination.    The mammographic images were interpreted with the assistance of a  Computer Aided Detection system.    FINDINGS:  These are scattered areas of fibroglandular density.    There are no suspicious masses, architectural distortion, or  microcalcifications within the right or left breast to indicate  malignancy. There is a stable biopsy clip in the upper outer quadrant of  the right breast.    Ultrasound scanning of the upper outer quadrant of the left breast in  the area of the patient's pain fails to demonstrate any sonographic  cystic or solid abnormalities. There is normal fatty and scant echogenic  fibroglandular tissue.    At this point, I would recommend clinical management of the patient's  left breast pain and routine mammographic follow-up in  one year.    Impression  1. No mammographic findings to indicate right or left breast malignancy.  2. Negative left breast ultrasound.  3. I would recommend clinical management of the patient's left breast  pain and routine mammographic follow-up in one year.    ASSESSMENT:  BIRADS:  BI-RADS 1. Negative.    BI-RADS category Key:  Category 0-incomplete-needs additional imaging and/or prior images for  comparison.  Category 1-negative.  Category 2-benign findings.  Category 3-probably benign-short interval follow-up suggested.  Category 4-suspicious abnormality-biopsy should be considered.  Category 5-highly suggestive for malignancy-appropriate action should be  taken.  Category 6-known biopsy-proven malignancy-appropriate action should be  taken.    NOTE: There is at least a 15% false-negative rate and mammographic  detection of cancer. Therefore, management of a palpable abnormality  must be based on clinical grounds and a negative mammography report  should not defer further breast evaluation when clinically indicated.    The patient's information is been entered into a reminder system ([facility])  with a targeted due date for the next mammogram.    Electronically Signed By-Jacobo Us MD On:12/3/2021 10:07 AM  This report was finalized on [id number] by  Jacobo Us MD.          Lab Review:   Admission on 12/24/2022, Discharged on 12/24/2022   Component Date Value   • Target HR (85%) 12/24/2022 137    • Max. Pred. HR (100%) 12/24/2022 161    • Left Cephalic Upper Color 12/24/2022 1.0    • Right Internal Jugular S* 12/24/2022 Y    • Right Internal Jugular P* 12/24/2022 Y    • Right Internal Jugular C* 12/24/2022 C    • Right Internal Jugular A* 12/24/2022 Y    • Right Subclavian Spont 12/24/2022 Y    • Right Subclavian Phasic 12/24/2022 Y    • Right Subclavian Compress 12/24/2022 C    • Right Subclavian Augment 12/24/2022 Y    • Left Internal Jugular Sp* 12/24/2022 Y    • Left Internal Jugular Ph* 12/24/2022 Y    •  Left Internal Jugular Co* 12/24/2022 C    • Left Internal Jugular Au* 12/24/2022 Y    • Left Subclavian Spont 12/24/2022 Y    • Left Subclavian Phasic 12/24/2022 Y    • Left Subclavian Compress 12/24/2022 C    • Left Subclavian Augment 12/24/2022 Y    • Left Axillary Spont 12/24/2022 Y    • Left Axillary Phasic 12/24/2022 Y    • Left Axillary Compress 12/24/2022 C    • Left Axillary Augment 12/24/2022 Y    • Left Brachial Compress 12/24/2022 C    • Left Radial Compress 12/24/2022 C    • Left Ulnar Compress 12/24/2022 C    • Left Basilic Upper Compr* 12/24/2022 C    • Left Basilic Forearm Com* 12/24/2022 C    • Left Cephalic Upper Comp* 12/24/2022 N    • Left Cephalic Upper Thro* 12/24/2022 A    • Left Cephalic Forearm Co* 12/24/2022 C    • BH CV VAS PRELIMINARY FI* 12/24/2022 1.0      Recent labs reviewed and interpreted for clinical significance and application            Level of Care:           Bull Barr MD  05/18/23  .

## 2023-06-19 RX ORDER — DILTIAZEM HYDROCHLORIDE 180 MG/1
180 CAPSULE, EXTENDED RELEASE ORAL DAILY
Qty: 90 CAPSULE | Refills: 2 | Status: SHIPPED | OUTPATIENT
Start: 2023-06-19

## 2023-07-14 ENCOUNTER — OFFICE (AMBULATORY)
Dept: URBAN - METROPOLITAN AREA CLINIC 64 | Facility: CLINIC | Age: 58
End: 2023-07-14

## 2023-07-14 VITALS
HEIGHT: 65 IN | DIASTOLIC BLOOD PRESSURE: 72 MMHG | HEART RATE: 87 BPM | WEIGHT: 256 LBS | SYSTOLIC BLOOD PRESSURE: 128 MMHG

## 2023-07-14 DIAGNOSIS — K21.9 GASTRO-ESOPHAGEAL REFLUX DISEASE WITHOUT ESOPHAGITIS: ICD-10-CM

## 2023-07-14 DIAGNOSIS — K90.9 INTESTINAL MALABSORPTION, UNSPECIFIED: ICD-10-CM

## 2023-07-14 DIAGNOSIS — D50.9 IRON DEFICIENCY ANEMIA, UNSPECIFIED: ICD-10-CM

## 2023-07-14 DIAGNOSIS — R13.10 DYSPHAGIA, UNSPECIFIED: ICD-10-CM

## 2023-07-14 PROCEDURE — 99214 OFFICE O/P EST MOD 30 MIN: CPT | Performed by: NURSE PRACTITIONER

## 2024-01-31 ENCOUNTER — TRANSCRIBE ORDERS (OUTPATIENT)
Dept: ADMINISTRATIVE | Facility: HOSPITAL | Age: 59
End: 2024-01-31
Payer: COMMERCIAL

## 2024-01-31 ENCOUNTER — LAB (OUTPATIENT)
Dept: LAB | Facility: HOSPITAL | Age: 59
End: 2024-01-31
Payer: COMMERCIAL

## 2024-01-31 DIAGNOSIS — M54.2 CERVICALGIA: ICD-10-CM

## 2024-01-31 DIAGNOSIS — J45.20 ASTHMA, MILD INTERMITTENT, POORLY CONTROLLED: ICD-10-CM

## 2024-01-31 DIAGNOSIS — I48.0 PAROXYSMAL ATRIAL FIBRILLATION: ICD-10-CM

## 2024-01-31 DIAGNOSIS — E53.8 BIOTIN-(PROPIONYL-COA-CARBOXYLASE) LIGASE DEFICIENCY: ICD-10-CM

## 2024-01-31 DIAGNOSIS — D53.9 SIMPLE CHRONIC ANEMIA: Primary | ICD-10-CM

## 2024-01-31 DIAGNOSIS — E55.9 AVITAMINOSIS D: ICD-10-CM

## 2024-01-31 DIAGNOSIS — F34.1 DYSTHYMIC DISORDER: ICD-10-CM

## 2024-01-31 DIAGNOSIS — E11.9 DIABETES MELLITUS WITHOUT COMPLICATION: ICD-10-CM

## 2024-01-31 DIAGNOSIS — D53.9 SIMPLE CHRONIC ANEMIA: ICD-10-CM

## 2024-01-31 LAB
25(OH)D3 SERPL-MCNC: 116 NG/ML (ref 30–100)
ALBUMIN SERPL-MCNC: 4.3 G/DL (ref 3.5–5.2)
ALBUMIN UR-MCNC: <1.2 MG/DL
ALBUMIN/GLOB SERPL: 1.3 G/DL
ALP SERPL-CCNC: 144 U/L (ref 39–117)
ALT SERPL W P-5'-P-CCNC: 26 U/L (ref 1–33)
ANION GAP SERPL CALCULATED.3IONS-SCNC: 12 MMOL/L (ref 5–15)
AST SERPL-CCNC: 24 U/L (ref 1–32)
BASOPHILS # BLD AUTO: 0.1 10*3/MM3 (ref 0–0.2)
BASOPHILS NFR BLD AUTO: 1.1 % (ref 0–1.5)
BILIRUB SERPL-MCNC: 0.3 MG/DL (ref 0–1.2)
BUN SERPL-MCNC: 16 MG/DL (ref 8–23)
BUN/CREAT SERPL: 21.1 (ref 7–25)
CALCIUM SPEC-SCNC: 9.7 MG/DL (ref 8.6–10.5)
CHLORIDE SERPL-SCNC: 103 MMOL/L (ref 98–107)
CHOLEST SERPL-MCNC: 202 MG/DL (ref 0–200)
CO2 SERPL-SCNC: 27 MMOL/L (ref 22–29)
CREAT SERPL-MCNC: 0.76 MG/DL (ref 0.57–1)
DEPRECATED RDW RBC AUTO: 45.9 FL (ref 37–54)
EGFRCR SERPLBLD CKD-EPI 2021: 89.8 ML/MIN/1.73
EOSINOPHIL # BLD AUTO: 0.1 10*3/MM3 (ref 0–0.4)
EOSINOPHIL NFR BLD AUTO: 1.2 % (ref 0.3–6.2)
ERYTHROCYTE [DISTWIDTH] IN BLOOD BY AUTOMATED COUNT: 15.4 % (ref 12.3–15.4)
GLOBULIN UR ELPH-MCNC: 3.3 GM/DL
GLUCOSE SERPL-MCNC: 98 MG/DL (ref 65–99)
HBA1C MFR BLD: 6.1 % (ref 4.8–5.6)
HCT VFR BLD AUTO: 41.2 % (ref 34–46.6)
HDLC SERPL-MCNC: 54 MG/DL (ref 40–60)
HGB BLD-MCNC: 13.4 G/DL (ref 12–15.9)
LDLC SERPL CALC-MCNC: 120 MG/DL (ref 0–100)
LDLC/HDLC SERPL: 2.15 {RATIO}
LYMPHOCYTES # BLD AUTO: 3 10*3/MM3 (ref 0.7–3.1)
LYMPHOCYTES NFR BLD AUTO: 38.3 % (ref 19.6–45.3)
MCH RBC QN AUTO: 28 PG (ref 26.6–33)
MCHC RBC AUTO-ENTMCNC: 32.4 G/DL (ref 31.5–35.7)
MCV RBC AUTO: 86.2 FL (ref 79–97)
MONOCYTES # BLD AUTO: 0.5 10*3/MM3 (ref 0.1–0.9)
MONOCYTES NFR BLD AUTO: 6.2 % (ref 5–12)
NEUTROPHILS NFR BLD AUTO: 4.2 10*3/MM3 (ref 1.7–7)
NEUTROPHILS NFR BLD AUTO: 53.2 % (ref 42.7–76)
NRBC BLD AUTO-RTO: 0 /100 WBC (ref 0–0.2)
PLATELET # BLD AUTO: 423 10*3/MM3 (ref 140–450)
PMV BLD AUTO: 7.2 FL (ref 6–12)
POTASSIUM SERPL-SCNC: 4.4 MMOL/L (ref 3.5–5.2)
PROT SERPL-MCNC: 7.6 G/DL (ref 6–8.5)
RBC # BLD AUTO: 4.78 10*6/MM3 (ref 3.77–5.28)
SODIUM SERPL-SCNC: 142 MMOL/L (ref 136–145)
TRIGL SERPL-MCNC: 159 MG/DL (ref 0–150)
TSH SERPL DL<=0.05 MIU/L-ACNC: 1.53 UIU/ML (ref 0.27–4.2)
VIT B12 BLD-MCNC: 980 PG/ML (ref 211–946)
VLDLC SERPL-MCNC: 28 MG/DL (ref 5–40)
WBC NRBC COR # BLD AUTO: 7.9 10*3/MM3 (ref 3.4–10.8)

## 2024-01-31 PROCEDURE — 82306 VITAMIN D 25 HYDROXY: CPT

## 2024-01-31 PROCEDURE — 36415 COLL VENOUS BLD VENIPUNCTURE: CPT

## 2024-01-31 PROCEDURE — 80050 GENERAL HEALTH PANEL: CPT

## 2024-01-31 PROCEDURE — 82043 UR ALBUMIN QUANTITATIVE: CPT

## 2024-01-31 PROCEDURE — 82607 VITAMIN B-12: CPT

## 2024-01-31 PROCEDURE — 83036 HEMOGLOBIN GLYCOSYLATED A1C: CPT

## 2024-01-31 PROCEDURE — 80061 LIPID PANEL: CPT

## 2024-02-27 ENCOUNTER — ON CAMPUS - OUTPATIENT (AMBULATORY)
Dept: URBAN - METROPOLITAN AREA HOSPITAL 2 | Facility: HOSPITAL | Age: 59
End: 2024-02-27

## 2024-02-27 ENCOUNTER — OFFICE (AMBULATORY)
Dept: URBAN - METROPOLITAN AREA PATHOLOGY 19 | Facility: PATHOLOGY | Age: 59
End: 2024-02-27

## 2024-02-27 VITALS
DIASTOLIC BLOOD PRESSURE: 64 MMHG | DIASTOLIC BLOOD PRESSURE: 78 MMHG | SYSTOLIC BLOOD PRESSURE: 133 MMHG | HEART RATE: 78 BPM | DIASTOLIC BLOOD PRESSURE: 60 MMHG | SYSTOLIC BLOOD PRESSURE: 112 MMHG | TEMPERATURE: 96.9 F | SYSTOLIC BLOOD PRESSURE: 155 MMHG | SYSTOLIC BLOOD PRESSURE: 114 MMHG | DIASTOLIC BLOOD PRESSURE: 73 MMHG | SYSTOLIC BLOOD PRESSURE: 156 MMHG | HEART RATE: 76 BPM | DIASTOLIC BLOOD PRESSURE: 88 MMHG | RESPIRATION RATE: 16 BRPM | HEART RATE: 73 BPM | OXYGEN SATURATION: 100 % | HEART RATE: 74 BPM | SYSTOLIC BLOOD PRESSURE: 142 MMHG | DIASTOLIC BLOOD PRESSURE: 71 MMHG | DIASTOLIC BLOOD PRESSURE: 90 MMHG | OXYGEN SATURATION: 98 % | HEART RATE: 79 BPM | OXYGEN SATURATION: 95 % | SYSTOLIC BLOOD PRESSURE: 138 MMHG | SYSTOLIC BLOOD PRESSURE: 135 MMHG | DIASTOLIC BLOOD PRESSURE: 91 MMHG | WEIGHT: 252 LBS | HEIGHT: 65 IN | RESPIRATION RATE: 12 BRPM

## 2024-02-27 DIAGNOSIS — K64.1 SECOND DEGREE HEMORRHOIDS: ICD-10-CM

## 2024-02-27 DIAGNOSIS — D12.3 BENIGN NEOPLASM OF TRANSVERSE COLON: ICD-10-CM

## 2024-02-27 PROBLEM — K63.5 POLYP OF COLON: Status: ACTIVE | Noted: 2024-02-27

## 2024-02-27 LAB
GI HISTOLOGY: A. TRANSVERSE COLON: (no result)
GI HISTOLOGY: PDF REPORT: (no result)

## 2024-02-27 PROCEDURE — 88305 TISSUE EXAM BY PATHOLOGIST: CPT | Performed by: PATHOLOGY

## 2024-02-27 PROCEDURE — 45385 COLONOSCOPY W/LESION REMOVAL: CPT | Performed by: INTERNAL MEDICINE

## 2024-03-08 ENCOUNTER — OFFICE (AMBULATORY)
Dept: URBAN - METROPOLITAN AREA CLINIC 64 | Facility: CLINIC | Age: 59
End: 2024-03-08

## 2024-03-08 VITALS
WEIGHT: 258 LBS | SYSTOLIC BLOOD PRESSURE: 106 MMHG | DIASTOLIC BLOOD PRESSURE: 68 MMHG | HEART RATE: 82 BPM | HEIGHT: 65 IN

## 2024-03-08 DIAGNOSIS — K64.1 SECOND DEGREE HEMORRHOIDS: ICD-10-CM

## 2024-03-08 DIAGNOSIS — K59.00 CONSTIPATION, UNSPECIFIED: ICD-10-CM

## 2024-03-08 PROCEDURE — 99214 OFFICE O/P EST MOD 30 MIN: CPT | Performed by: NURSE PRACTITIONER

## 2024-03-08 RX ORDER — SORBITOL SOLUTION 70 %
SOLUTION, ORAL MISCELLANEOUS
Qty: 100 | Refills: 1 | Status: COMPLETED
Start: 2024-03-08 | End: 2024-08-23

## 2024-03-11 RX ORDER — METOPROLOL SUCCINATE 25 MG/1
25 TABLET, EXTENDED RELEASE ORAL DAILY
Qty: 90 TABLET | Refills: 0 | Status: SHIPPED | OUTPATIENT
Start: 2024-03-11

## 2024-03-11 RX ORDER — DILTIAZEM HYDROCHLORIDE 180 MG/1
180 CAPSULE, EXTENDED RELEASE ORAL DAILY
Qty: 90 CAPSULE | Refills: 0 | Status: SHIPPED | OUTPATIENT
Start: 2024-03-11

## 2024-06-21 RX ORDER — DILTIAZEM HYDROCHLORIDE 180 MG/1
180 CAPSULE, EXTENDED RELEASE ORAL DAILY
Qty: 90 CAPSULE | Refills: 0 | Status: SHIPPED | OUTPATIENT
Start: 2024-06-21

## 2024-07-08 ENCOUNTER — OFFICE VISIT (OUTPATIENT)
Dept: CARDIOLOGY | Facility: CLINIC | Age: 59
End: 2024-07-08
Payer: COMMERCIAL

## 2024-07-08 VITALS
WEIGHT: 251.4 LBS | BODY MASS INDEX: 41.88 KG/M2 | RESPIRATION RATE: 18 BRPM | OXYGEN SATURATION: 97 % | DIASTOLIC BLOOD PRESSURE: 75 MMHG | HEART RATE: 68 BPM | HEIGHT: 65 IN | SYSTOLIC BLOOD PRESSURE: 129 MMHG

## 2024-07-08 DIAGNOSIS — I10 ESSENTIAL HYPERTENSION: Primary | ICD-10-CM

## 2024-07-08 DIAGNOSIS — I48.0 PAROXYSMAL ATRIAL FIBRILLATION: ICD-10-CM

## 2024-07-08 PROCEDURE — 99213 OFFICE O/P EST LOW 20 MIN: CPT | Performed by: NURSE PRACTITIONER

## 2024-07-08 PROCEDURE — 93000 ELECTROCARDIOGRAM COMPLETE: CPT | Performed by: NURSE PRACTITIONER

## 2024-07-08 RX ORDER — DILTIAZEM HYDROCHLORIDE 180 MG/1
1 CAPSULE, COATED, EXTENDED RELEASE ORAL DAILY
COMMUNITY
Start: 2024-06-24

## 2024-07-09 NOTE — PROGRESS NOTES
Cardiology Office Follow Up Visit      Primary Care Provider:  Betsy Pham MD    Reason for f/u:     Hypertension  Paroxysmal atrial fibrillation      Subjective     CC:    Denies chest pain or dyspnea    History of Present Illness       Bertha Peralta is a 58 y.o. female.  Patient is a very pleasant 58-year-old female who presents the office today for follow-up.    Patient is known to have paroxysmal atrial fibrillation, hypertension, diabetes.    Patient is known to have previous gastric bypass.  She had previous cardioversion for atrial fibrillation.  She has been anticoagulated with Xarelto.    Patient denies any current or recent chest pain, dyspnea, palpitations, near syncope, lower extremity edema or feelings of her heart racing.    She has some occasional episodes of lightheadedness that are brief and not long-lasting.    Patient's previous stress Myoview in 2017 showed no reversible ischemia.    Blood pressure today is stable.      ASSESSMENT/PLAN:      Diagnoses and all orders for this visit:    1. Essential hypertension (Primary)  Comments:  Stable on current medical therapy    2. Paroxysmal atrial fibrillation  Comments:  No recurrent palpitations to suggest atrial fibrillation            MEDICAL DECISION MAKING:      Patient at this time is reporting no symptoms suggestive of angina or heart failure.    She reports compliance with prescribed medical therapy.    Blood pressure stable.  EKG shows no acute ST or T wave segment abnormalities.  There is been no clinical recurrence of atrial fibrillation.    The patient has had recent labs by her primary care physician with lipid panel and patient was told that labs were stable.  We we will call and request a copy of these as they are not in the epic system.    Patient will have scheduled follow-up with Dr. Barr in 1 year if she develops any new or worsening problems of asked her to contact the office sooner.      Past Medical History:   Diagnosis Date     AF (paroxysmal atrial fibrillation)     Coronary artery disease     Essential hypertension 7/21/2017    Gastroesophageal reflux disease 3/23/2020    GERD (gastroesophageal reflux disease)     Paroxysmal atrial fibrillation 3/23/2020    Sleep apnea 3/23/2020       Past Surgical History:   Procedure Laterality Date    BREAST BIOPSY Right     CORONARY ARTERY BYPASS GRAFT      HYSTERECTOMY           Current Outpatient Medications:     acetaminophen-codeine (TYLENOL #3) 300-30 MG per tablet, Take 1 tablet by mouth Every 6 (Six) Hours As Needed for Moderate Pain., Disp: 10 tablet, Rfl: 0    albuterol (PROVENTIL) (2.5 MG/3ML) 0.083% nebulizer solution, As Needed., Disp: , Rfl:     aspirin 81 MG chewable tablet, Chew 1 tablet Daily., Disp: , Rfl:     cetirizine (zyrTEC) 10 MG tablet, Take 1 tablet by mouth Daily., Disp: , Rfl:     cholecalciferol (VITAMIN D3) 1000 units tablet, Take 1 tablet by mouth Daily., Disp: , Rfl:     cloNIDine (CATAPRES) 0.1 MG tablet, Take 1 tablet by mouth Daily., Disp: , Rfl:     colesevelam (WELCHOL) 625 MG tablet, Take 1 tablet by mouth Daily., Disp: , Rfl:     cyanocobalamin 1000 MCG/ML injection, Every 30 (Thirty) Days., Disp: , Rfl:     cyclobenzaprine (FLEXERIL) 10 MG tablet, Take 1 tablet by mouth 2 (Two) Times a Day., Disp: , Rfl:     dilTIAZem CD (CARDIZEM CD) 180 MG 24 hr capsule, Take 1 capsule by mouth Daily., Disp: , Rfl:     Farxiga 5 MG tablet tablet, Take 1 tablet by mouth Daily., Disp: , Rfl:     FLUoxetine (PROzac) 20 MG capsule, Take 1 capsule by mouth Daily., Disp: , Rfl:     FLUoxetine (PROzac) 40 MG capsule, 1 capsule Daily., Disp: , Rfl:     metFORMIN (GLUCOPHAGE) 500 MG tablet, Take 1 tablet by mouth 2 (Two) Times a Day With Meals., Disp: , Rfl:     metoclopramide (REGLAN) 10 MG tablet, Take 1 tablet by mouth Every 12 (Twelve) Hours., Disp: , Rfl:     metoprolol succinate XL (TOPROL-XL) 25 MG 24 hr tablet, TAKE 1 TABLET BY MOUTH DAILY, Disp: 90 tablet, Rfl: 0     "omeprazole (priLOSEC) 40 MG capsule, Take 1 capsule by mouth Daily., Disp: , Rfl:     Ozempic, 0.25 or 0.5 MG/DOSE, 2 MG/1.5ML solution pen-injector, Inject 0.5 mg under the skin into the appropriate area as directed 1 (One) Time Per Week., Disp: , Rfl:     pantoprazole (PROTONIX) 40 MG EC tablet, Take 1 tablet by mouth Every Morning., Disp: , Rfl:     tolterodine (DETROL) 2 MG tablet, Take 1 tablet by mouth Daily., Disp: , Rfl:     Xarelto 20 MG tablet, TAKE 1 TABLET BY MOUTH DAILY, Disp: 30 tablet, Rfl: 11    Social History     Socioeconomic History    Marital status:    Tobacco Use    Smoking status: Never     Passive exposure: Never    Smokeless tobacco: Never   Vaping Use    Vaping status: Never Used   Substance and Sexual Activity    Alcohol use: Not Currently    Drug use: Never    Sexual activity: Defer       Family History   Problem Relation Age of Onset    Breast cancer Mother 35    Breast cancer Maternal Grandmother        The following portions of the patient's history were reviewed and updated as appropriate: allergies, current medications, past family history, past medical history, past social history, past surgical history and problem list.    Review of Systems   All other systems reviewed and are negative.      Pertinent items are noted in HPI, all other systems reviewed and negative    /75 (BP Location: Left arm, Patient Position: Sitting, Cuff Size: Adult)   Pulse 68   Resp 18   Ht 165.1 cm (65\")   Wt 114 kg (251 lb 6.4 oz)   SpO2 97%   BMI 41.84 kg/m² .  Objective     Vitals reviewed.   Constitutional:       General: Not in acute distress.     Appearance: Normal appearance. Well-developed.   Eyes:      Pupils: Pupils are equal, round, and reactive to light.   HENT:      Head: Normocephalic and atraumatic.   Neck:      Vascular: No JVD.   Pulmonary:      Effort: Pulmonary effort is normal.      Breath sounds: Normal breath sounds.   Cardiovascular:      Normal rate. Regular " rhythm.   Pulses:     Intact distal pulses.   Edema:     Peripheral edema absent.   Abdominal:      General: There is no distension.      Palpations: Abdomen is soft.      Tenderness: There is no abdominal tenderness.   Musculoskeletal: Normal range of motion.      Cervical back: Normal range of motion and neck supple. Skin:     General: Skin is warm and dry.   Neurological:      Mental Status: Alert and oriented to person, place, and time.             ECG 12 Lead    Date/Time: 7/8/2024 8:31 AM  Performed by: Radha Kim APRN    Authorized by: Radha Kim APRN  Comparison: compared with previous ECG   Similar to previous ECG  Rhythm: sinus rhythm  Rate: normal  BPM: 66  Conduction: conduction normal  ST Segments: ST segments normal  T Waves: T waves normal  QRS axis: normal  Other findings: low voltage    Clinical impression: non-specific ECG          EKG ordered by and reviewed by me in office

## 2024-07-24 RX ORDER — METOPROLOL SUCCINATE 25 MG/1
25 TABLET, EXTENDED RELEASE ORAL DAILY
Qty: 90 TABLET | Refills: 0 | Status: SHIPPED | OUTPATIENT
Start: 2024-07-24

## 2024-08-23 ENCOUNTER — OFFICE (AMBULATORY)
Age: 59
End: 2024-08-23

## 2024-08-23 ENCOUNTER — OFFICE (AMBULATORY)
Dept: URBAN - METROPOLITAN AREA CLINIC 64 | Facility: CLINIC | Age: 59
End: 2024-08-23

## 2024-08-23 VITALS
WEIGHT: 257 LBS | DIASTOLIC BLOOD PRESSURE: 58 MMHG | SYSTOLIC BLOOD PRESSURE: 94 MMHG | SYSTOLIC BLOOD PRESSURE: 94 MMHG | HEART RATE: 77 BPM | SYSTOLIC BLOOD PRESSURE: 94 MMHG | WEIGHT: 257 LBS | SYSTOLIC BLOOD PRESSURE: 94 MMHG | HEART RATE: 77 BPM | HEART RATE: 77 BPM | HEART RATE: 77 BPM | HEIGHT: 65 IN | DIASTOLIC BLOOD PRESSURE: 58 MMHG | SYSTOLIC BLOOD PRESSURE: 94 MMHG | WEIGHT: 257 LBS | SYSTOLIC BLOOD PRESSURE: 94 MMHG | WEIGHT: 257 LBS | HEART RATE: 77 BPM | HEIGHT: 65 IN | DIASTOLIC BLOOD PRESSURE: 58 MMHG | HEIGHT: 65 IN | HEIGHT: 65 IN | HEIGHT: 65 IN | WEIGHT: 257 LBS | HEIGHT: 65 IN | DIASTOLIC BLOOD PRESSURE: 58 MMHG | DIASTOLIC BLOOD PRESSURE: 58 MMHG | HEIGHT: 65 IN | WEIGHT: 257 LBS | HEART RATE: 77 BPM | DIASTOLIC BLOOD PRESSURE: 58 MMHG | SYSTOLIC BLOOD PRESSURE: 94 MMHG | HEART RATE: 77 BPM | DIASTOLIC BLOOD PRESSURE: 58 MMHG | WEIGHT: 257 LBS

## 2024-08-23 DIAGNOSIS — K59.00 CONSTIPATION, UNSPECIFIED: ICD-10-CM

## 2024-08-23 DIAGNOSIS — R15.2 FECAL URGENCY: ICD-10-CM

## 2024-08-23 DIAGNOSIS — D50.9 IRON DEFICIENCY ANEMIA, UNSPECIFIED: ICD-10-CM

## 2024-08-23 DIAGNOSIS — K62.5 HEMORRHAGE OF ANUS AND RECTUM: ICD-10-CM

## 2024-08-23 DIAGNOSIS — R13.10 DYSPHAGIA, UNSPECIFIED: ICD-10-CM

## 2024-08-23 PROCEDURE — 99213 OFFICE O/P EST LOW 20 MIN: CPT | Performed by: NURSE PRACTITIONER

## 2024-10-15 ENCOUNTER — APPOINTMENT (OUTPATIENT)
Dept: CT IMAGING | Facility: HOSPITAL | Age: 59
End: 2024-10-15
Payer: COMMERCIAL

## 2024-10-15 ENCOUNTER — HOSPITAL ENCOUNTER (EMERGENCY)
Facility: HOSPITAL | Age: 59
Discharge: HOME | End: 2024-10-15
Attending: EMERGENCY MEDICINE | Admitting: EMERGENCY MEDICINE
Payer: COMMERCIAL

## 2024-10-15 VITALS
RESPIRATION RATE: 18 BRPM | SYSTOLIC BLOOD PRESSURE: 138 MMHG | HEART RATE: 79 BPM | DIASTOLIC BLOOD PRESSURE: 51 MMHG | BODY MASS INDEX: 42.8 KG/M2 | HEIGHT: 65 IN | OXYGEN SATURATION: 95 % | TEMPERATURE: 97.7 F | WEIGHT: 256.9 LBS

## 2024-10-15 DIAGNOSIS — I80.8 SUPERFICIAL THROMBOPHLEBITIS OF LEFT UPPER EXTREMITY: ICD-10-CM

## 2024-10-15 DIAGNOSIS — S09.90XA CLOSED HEAD INJURY, INITIAL ENCOUNTER: Primary | ICD-10-CM

## 2024-10-15 DIAGNOSIS — S16.1XXA CERVICAL STRAIN, ACUTE, INITIAL ENCOUNTER: ICD-10-CM

## 2024-10-15 DIAGNOSIS — M79.602 LEFT ARM PAIN: ICD-10-CM

## 2024-10-15 DIAGNOSIS — S80.10XA CONTUSION OF MULTIPLE SITES OF LOWER EXTREMITY, UNSPECIFIED LATERALITY, INITIAL ENCOUNTER: ICD-10-CM

## 2024-10-15 PROCEDURE — 70450 CT HEAD/BRAIN W/O DYE: CPT

## 2024-10-15 PROCEDURE — 99284 EMERGENCY DEPT VISIT MOD MDM: CPT | Performed by: EMERGENCY MEDICINE

## 2024-10-15 PROCEDURE — 72125 CT NECK SPINE W/O DYE: CPT

## 2024-10-15 PROCEDURE — 99284 EMERGENCY DEPT VISIT MOD MDM: CPT

## 2024-10-15 RX ORDER — METHOCARBAMOL 750 MG/1
750 TABLET, FILM COATED ORAL 3 TIMES DAILY PRN
Qty: 20 TABLET | Refills: 0 | Status: SHIPPED | OUTPATIENT
Start: 2024-10-15

## 2024-10-15 RX ORDER — ACETAMINOPHEN AND CODEINE PHOSPHATE 300; 30 MG/1; MG/1
1 TABLET ORAL EVERY 4 HOURS PRN
Qty: 15 TABLET | Refills: 0 | Status: SHIPPED | OUTPATIENT
Start: 2024-10-15

## 2024-10-15 NOTE — FS[facility] PROVIDER NOTE
Subjective   History of Present Illness  The patient is a 58-year-old female presents emergency room with complaints of injuries related to a fall.  She is on Xarelto for history of paroxysmal atrial fibrillation.  Patient states that she fell in the bathtub 4 days ago.  The patient injured her left leg as well as striking her head and injuring her neck.  She reports that since the fall, she has had a persistent headache.  She denies loss of consciousness during the initial trauma.  Patient states that she had some sort of family event, so that is why she did not come to the hospital earlier.  She reports that she has had persistent headache and felt foggy.  She reports it hurts to turn her neck.  She has had achiness to left lower extremity.  She was seen by her primary care physician, who sent her over here for further evaluation.        Review of Systems   Constitutional: Negative.  Negative for chills, fatigue and fever.   Eyes: Negative.    Respiratory:  Negative for cough, chest tightness and shortness of breath.    Cardiovascular:  Negative for chest pain and palpitations.   Gastrointestinal:  Negative for abdominal pain, diarrhea, nausea and vomiting.   Genitourinary: Negative.    Musculoskeletal:  Positive for arthralgias and myalgias.   Skin: Negative.  Negative for rash.   Neurological:  Positive for headaches. Negative for syncope, weakness and numbness.   Psychiatric/Behavioral:  Positive for confusion. Negative for hallucinations.    All other systems reviewed and are negative.      Past Medical History:   Diagnosis Date    AF (paroxysmal atrial fibrillation)     Coronary artery disease     Essential hypertension 7/21/2017    Gastroesophageal reflux disease 3/23/2020    GERD (gastroesophageal reflux disease)     Paroxysmal atrial fibrillation 3/23/2020    Sleep apnea 3/23/2020       No Active Allergies    Past Surgical History:   Procedure Laterality Date    BREAST BIOPSY Right     CORONARY ARTERY  BYPASS GRAFT      HYSTERECTOMY         Family History   Problem Relation Age of Onset    Breast cancer Mother 35    Breast cancer Maternal Grandmother        Social History     Socioeconomic History    Marital status:    Tobacco Use    Smoking status: Never     Passive exposure: Never    Smokeless tobacco: Never   Vaping Use    Vaping status: Never Used   Substance and Sexual Activity    Alcohol use: Not Currently    Drug use: Never    Sexual activity: Defer           Objective   Physical Exam  Vitals and nursing note reviewed.   Constitutional:       General: She is not in acute distress.     Appearance: Normal appearance. She is normal weight.   HENT:      Right Ear: External ear normal.      Left Ear: External ear normal.      Nose: Nose normal.   Cardiovascular:      Rate and Rhythm: Normal rate.   Pulmonary:      Effort: Pulmonary effort is normal.   Abdominal:      General: Abdomen is flat.      Tenderness: There is no abdominal tenderness. There is no guarding or rebound.   Musculoskeletal:         General: No swelling or tenderness. Normal range of motion.      Cervical back: Normal range of motion.      Right lower leg: No edema.      Left lower leg: No edema.   Skin:     Capillary Refill: Capillary refill takes less than 2 seconds.   Neurological:      General: No focal deficit present.      Mental Status: She is alert and oriented to person, place, and time.      Cranial Nerves: No cranial nerve deficit.      Sensory: No sensory deficit.      Coordination: Coordination normal.      Deep Tendon Reflexes: Reflexes normal.      Comments: Normal neurological exam   Psychiatric:         Mood and Affect: Mood normal.         Procedures           ED Course  ED Course as of 10/15/24 1845   Tue Oct 15, 2024   1743 CT head and neck requested. [KZ]   1751 CT head and neck are negative.  Patient discharged home with reassurance. [KZ]      ED Course User Index  [KZ] Asa Raza MD                                            Medical Decision Making  The patient presents to the emergency room with reported head injury.  The patient reports no loss of consciousness at the time of the injury.  For exact mechanism of injury, see HPI above.  Differential diagnosis includes intracranial hemorrhage, closed head injury, concussion, skull fracture, and/or other head injury.  We used the [place] CT head injury/trauma rule.  Using this to risk stratify the patient, it is determined that the patient will need imaging of the brain because of her use of anticoagulation.  Shared medical decision making used.  The patient does not have any evidence for any other traumatic injury.  No torso or extremity trauma.      Imaging does not show anything worrisome.  Patient is prescribed Tylenol 3 and muscle relaxers to go home with.      Problems Addressed:  Cervical strain, acute, initial encounter: complicated acute illness or injury  Closed head injury, initial encounter: complicated acute illness or injury  Contusion of multiple sites of lower extremity, unspecified laterality, initial encounter: complicated acute illness or injury    Amount and/or Complexity of Data Reviewed  Radiology: ordered. Decision-making details documented in ED Course.    Risk  Prescription drug management.        Final diagnoses:   Closed head injury, initial encounter   Cervical strain, acute, initial encounter   Contusion of multiple sites of lower extremity, unspecified laterality, initial encounter       ED Disposition  ED Disposition       ED Disposition   Discharge    Condition   Stable    Comment   --               Betsy Pham MD  [address] [address]  [phone]    Schedule an appointment as soon as possible for a visit in 1 week  For repeat evaluation         Medication List        New Prescriptions      methocarbamol 750 MG tablet  Commonly known as: ROBAXIN  Take 1 tablet by mouth 3 (Three) Times a Day As Needed  for Muscle Spasms.            Changed      acetaminophen-codeine 300-30 MG per tablet  Commonly known as: TYLENOL with CODEINE #3  Take 1 tablet by mouth Every 4 (Four) Hours As Needed for Moderate Pain or Severe Pain.  What changed:   when to take this  reasons to take this               Where to Get Your Medications        These medications were sent to [facility]/pharmacy #[id number] - [place], [place] - [address] [place] [address] - [phone]  - [phone] [address] [place] [address]      Hours: 24-hours Phone: [phone]   acetaminophen-codeine 300-30 MG per tablet  methocarbamol 750 MG tablet

## 2024-10-15 NOTE — DISCHARGE INSTRUCTIONS
You have been involved in a fall with injuries that do not require hospitalization.  Please take muscle relaxants and pain medication as prescribed in that order for severity of illness.  Follow-up with primary care physician in the next 3 to 7 days for repeat evaluation, sooner if required.  Return to ER for any concerns.  Rest, ice and elevate injured areas.

## 2024-10-16 ENCOUNTER — TELEPHONE (OUTPATIENT)
Dept: CARDIOLOGY | Facility: CLINIC | Age: 59
End: 2024-10-16
Payer: COMMERCIAL

## 2024-10-16 NOTE — TELEPHONE ENCOUNTER
Caller: Bertha Peralta    Relationship to patient: Self    Best call back number: [phone]     Chief complaint: SYNCOPE AND COLLAPSE / SOB    Type of visit: FU    Requested date: ASAP     If rescheduling, when is the original appointment: NA     Additional notes: PT CALLING TO REQUEST AN APPT AS SHE HAS HAD SOME SYNCOPE EPISODES RECENTLY AND IS ALSO HAVING THE SHORTNESS OF BREATH - PT SEEN HER GI PROVIDER, DR GUZMAN, AND WAS ADVISED TO SEE CARDIOLOGY ASAP TO MAKE SURE IT WASNT HEART RELATED - PT HAS HAD 3 EPISODES WHERE SHE HAS FAINTED, SHE STATES SHE DID FULLY LOSE CONCIOUSNESS AND THE MOST RECENT TIME SHE HIT HER HEAD ON HER BATHROOM CABINET ON THE WAY DOWN AND CUT HER HEAD - PT STATES THERE WILL BE TIMES WHERE SHE WILL BE SITTING DOWN AND SHE WILL BE DRENCHED IN SWEAT AND FEELS VERY HOT AND LIKE SHE CANT BREATHE - PTS  HAS HAD A BLOCKAGE AND HAD SYMPTOMS SIMILAR TO WHAT SHE IS GOING THROUGH WHICH WORRIES HER - PT STATES SHE CAN FEEL HER HR AND PALPITATIONS - PT REQUESTING TO BE SEEN ASAP IF POSSIBLE

## 2024-10-21 ENCOUNTER — OFFICE VISIT (OUTPATIENT)
Dept: CARDIOLOGY | Facility: CLINIC | Age: 59
End: 2024-10-21
Payer: COMMERCIAL

## 2024-10-21 VITALS
SYSTOLIC BLOOD PRESSURE: 125 MMHG | WEIGHT: 257.2 LBS | OXYGEN SATURATION: 96 % | HEIGHT: 65 IN | HEART RATE: 86 BPM | BODY MASS INDEX: 42.85 KG/M2 | DIASTOLIC BLOOD PRESSURE: 77 MMHG | RESPIRATION RATE: 18 BRPM

## 2024-10-21 DIAGNOSIS — R55 SYNCOPE AND COLLAPSE: ICD-10-CM

## 2024-10-21 DIAGNOSIS — R07.9 CHEST PAIN, UNSPECIFIED TYPE: ICD-10-CM

## 2024-10-21 DIAGNOSIS — R06.09 DYSPNEA ON EXERTION: Primary | ICD-10-CM

## 2024-10-21 PROCEDURE — 93000 ELECTROCARDIOGRAM COMPLETE: CPT | Performed by: NURSE PRACTITIONER

## 2024-10-21 PROCEDURE — 99214 OFFICE O/P EST MOD 30 MIN: CPT | Performed by: NURSE PRACTITIONER

## 2024-10-21 RX ORDER — PREDNISONE 20 MG/1
20 TABLET ORAL AS NEEDED
COMMUNITY
Start: 2024-06-24

## 2024-10-21 RX ORDER — MULTIVITAMIN WITH IRON
250 TABLET ORAL DAILY
COMMUNITY

## 2024-10-21 NOTE — PROGRESS NOTES
Cardiology Office Follow Up Visit      Primary Care Provider:  Betsy Pham MD    Reason for f/u:     Dyspnea on exertion, syncope chest pain      Subjective     CC:    Dyspnea on exertion, syncope chest pain    History of Present Illness       Bertha Peralta is a 58 y.o. female who is a patient of Dr. Barr. Pmh includes paroxysmal atrial fibrillation, HTN, DM, gastric bypass. She is s/p prior cardioversion for atrial fibrillation. She is on Xarelto for anticoagulation. She had a prior stress test in 2017 which showed no ischemia. She has not had work up since.    She presents today for follow up. She has been experiencing a couple of episodes of lightheadedness / pre syncope / and tells me she passed out in a hotel bathtub recently. She also notes dyspnea on exertion, chest pressure. She followed up with her PCP for these symptoms and was advised to get evaluated by cardiology. She denies palpations, she states she can feel when she is in atrial fibrillation and this does not feel like Afib.          ASSESSMENT/PLAN:      Diagnoses and all orders for this visit:    1. Dyspnea on exertion (Primary)  -     Adult Transthoracic Echo Complete W/ Color, Spectral and Contrast if Necessary Per Protocol; Future  -     Stress Test With Myocardial Perfusion (1 Day); Future    2. Chest pain, unspecified type  -     Adult Transthoracic Echo Complete W/ Color, Spectral and Contrast if Necessary Per Protocol; Future  -     Stress Test With Myocardial Perfusion (1 Day); Future    3. Syncope and collapse  -     Adult Transthoracic Echo Complete W/ Color, Spectral and Contrast if Necessary Per Protocol; Future      4. History of atrial fibrillation on Xarelto, s/p prior cardioversion      MEDICAL DECISION MAKING:  I will check a 2D ECHO to assess heart structure and function. Given her chest discomfort, I will check a nuclear lexiscan stress test. Patient can not walk on treadmill secondary to musculoskeletal issues. F/u in 3  mo.        Past Medical History:   Diagnosis Date    AF (paroxysmal atrial fibrillation)     Coronary artery disease     Essential hypertension 7/21/2017    Gastroesophageal reflux disease 3/23/2020    GERD (gastroesophageal reflux disease)     Paroxysmal atrial fibrillation 3/23/2020    Sleep apnea 3/23/2020       Past Surgical History:   Procedure Laterality Date    BREAST BIOPSY Right     CORONARY ARTERY BYPASS GRAFT      HYSTERECTOMY           Current Outpatient Medications:     acetaminophen-codeine (TYLENOL with CODEINE #3) 300-30 MG per tablet, Take 1 tablet by mouth Every 4 (Four) Hours As Needed for Moderate Pain or Severe Pain., Disp: 15 tablet, Rfl: 0    aspirin 81 MG chewable tablet, Chew 1 tablet Daily., Disp: , Rfl:     cetirizine (zyrTEC) 10 MG tablet, Take 1 tablet by mouth Daily., Disp: , Rfl:     cholecalciferol (VITAMIN D3) 1000 units tablet, Take 1 tablet by mouth Daily., Disp: , Rfl:     cloNIDine (CATAPRES) 0.1 MG tablet, Take 1 tablet by mouth Daily., Disp: , Rfl:     colesevelam (WELCHOL) 625 MG tablet, Take 1 tablet by mouth Daily., Disp: , Rfl:     cyanocobalamin 1000 MCG/ML injection, Every 30 (Thirty) Days., Disp: , Rfl:     cyclobenzaprine (FLEXERIL) 10 MG tablet, Take 1 tablet by mouth 2 (Two) Times a Day., Disp: , Rfl:     dilTIAZem CD (CARDIZEM CD) 180 MG 24 hr capsule, Take 1 capsule by mouth Daily., Disp: , Rfl:     Farxiga 5 MG tablet tablet, Take 1 tablet by mouth Daily., Disp: , Rfl:     FLUoxetine (PROzac) 20 MG capsule, Take 1 capsule by mouth Daily., Disp: , Rfl:     FLUoxetine (PROzac) 40 MG capsule, 1 capsule Daily., Disp: , Rfl:     metFORMIN (GLUCOPHAGE) 500 MG tablet, Take 1 tablet by mouth 2 (Two) Times a Day With Meals. (Patient taking differently: Take 1 tablet by mouth 3 (Three) Times a Day.), Disp: , Rfl:     methocarbamol (ROBAXIN) 750 MG tablet, Take 1 tablet by mouth 3 (Three) Times a Day As Needed for Muscle Spasms., Disp: 20 tablet, Rfl: 0    metoclopramide  (REGLAN) 10 MG tablet, Take 1 tablet by mouth Every 12 (Twelve) Hours., Disp: , Rfl:     metoprolol succinate XL (TOPROL-XL) 25 MG 24 hr tablet, TAKE 1 TABLET BY MOUTH DAILY, Disp: 90 tablet, Rfl: 0    omeprazole (priLOSEC) 40 MG capsule, Take 1 capsule by mouth Daily., Disp: , Rfl:     Ozempic, 0.25 or 0.5 MG/DOSE, 2 MG/1.5ML solution pen-injector, Inject 0.5 mg under the skin into the appropriate area as directed 1 (One) Time Per Week., Disp: , Rfl:     pantoprazole (PROTONIX) 40 MG EC tablet, Take 1 tablet by mouth Every Morning., Disp: , Rfl:     predniSONE (DELTASONE) 20 MG tablet, Take 1 tablet by mouth As Needed., Disp: , Rfl:     tolterodine (DETROL) 2 MG tablet, Take 1 tablet by mouth Daily., Disp: , Rfl:     Xarelto 20 MG tablet, TAKE 1 TABLET BY MOUTH DAILY, Disp: 30 tablet, Rfl: 11    Magnesium 250 MG tablet, Take 1 tablet by mouth Daily., Disp: , Rfl:     Social History     Socioeconomic History    Marital status:    Tobacco Use    Smoking status: Never     Passive exposure: Never    Smokeless tobacco: Never   Vaping Use    Vaping status: Never Used   Substance and Sexual Activity    Alcohol use: Not Currently    Drug use: Never    Sexual activity: Defer       Family History   Problem Relation Age of Onset    Breast cancer Mother 35    Breast cancer Maternal Grandmother        The following portions of the patient's history were reviewed and updated as appropriate: allergies, current medications, past family history, past medical history, past social history, past surgical history and problem list.    Review of Systems   Constitutional: Positive for malaise/fatigue. Negative for chills and diaphoresis.   Cardiovascular:  Positive for chest pain, dyspnea on exertion, near-syncope and syncope. Negative for irregular heartbeat, leg swelling, orthopnea, palpitations and paroxysmal nocturnal dyspnea.   Respiratory:  Positive for shortness of breath. Negative for cough, sleep disturbances due to  "breathing and sputum production.    Gastrointestinal:  Negative for change in bowel habit.   Genitourinary:  Negative for urgency.   Neurological:  Positive for dizziness and weakness. Negative for headaches.   Psychiatric/Behavioral:  Negative for altered mental status.        Pertinent items are noted in HPI, all other systems reviewed and negative    /77 (BP Location: Right arm, Patient Position: Sitting, Cuff Size: Large Adult)   Pulse 86   Resp 18   Ht 165.1 cm (65\")   Wt 117 kg (257 lb 3.2 oz)   SpO2 96%   BMI 42.80 kg/m² .  Objective     Constitutional:       Appearance: Not in distress.   Neck:      Vascular: JVD normal.   Pulmonary:      Effort: Pulmonary effort is normal.      Breath sounds: Normal breath sounds.   Cardiovascular:      Normal rate. Regular rhythm.   Pulses:     Intact distal pulses.   Edema:     Peripheral edema absent.   Abdominal:      General: Bowel sounds are normal.      Palpations: Abdomen is soft.   Musculoskeletal: Normal range of motion. Skin:     General: Skin is warm and dry.   Neurological:      General: No focal deficit present.      Mental Status: Oriented to person, place and time.             ECG 12 Lead    Date/Time: 10/21/2024 10:00 AM  Performed by: Vanda Avery APRN    Authorized by: Vanda Avery APRN  Comparison: not compared with previous ECG   Previous ECG: no previous ECG available  Rhythm: sinus rhythm  Rate: normal  BPM: 86  Q waves: III and aVF            EKG ordered by and reviewed by me in office             Bertha REBECCA Coreasjacquelynkrari  reports that she has never smoked. She has never been exposed to tobacco smoke. She has never used smokeless tobacco. I have educated her on the risk of diseases from using tobacco products such as cancer, COPD, and heart disease.   "

## 2024-10-30 ENCOUNTER — HOSPITAL ENCOUNTER (OUTPATIENT)
Dept: NUCLEAR MEDICINE | Facility: HOSPITAL | Age: 59
Discharge: HOME | End: 2024-10-30
Payer: COMMERCIAL

## 2024-10-30 ENCOUNTER — HOSPITAL ENCOUNTER (OUTPATIENT)
Dept: CARDIOLOGY | Facility: HOSPITAL | Age: 59
Discharge: HOME | End: 2024-10-30
Admitting: NURSE PRACTITIONER
Payer: COMMERCIAL

## 2024-10-30 DIAGNOSIS — R55 SYNCOPE AND COLLAPSE: ICD-10-CM

## 2024-10-30 DIAGNOSIS — R06.09 DYSPNEA ON EXERTION: ICD-10-CM

## 2024-10-30 DIAGNOSIS — R07.9 CHEST PAIN, UNSPECIFIED TYPE: ICD-10-CM

## 2024-10-30 LAB
BH CV REST NUCLEAR ISOTOPE DOSE: 11 MCI
BH CV STRESS BP STAGE 1: NORMAL
BH CV STRESS BP STAGE 2: NORMAL
BH CV STRESS COMMENTS STAGE 1: NORMAL
BH CV STRESS COMMENTS STAGE 2: NORMAL
BH CV STRESS DOSE REGADENOSON STAGE 1: 0.4
BH CV STRESS DURATION MIN STAGE 1: 0
BH CV STRESS DURATION MIN STAGE 2: 4
BH CV STRESS DURATION SEC STAGE 1: 10
BH CV STRESS DURATION SEC STAGE 2: 0
BH CV STRESS HR STAGE 1: 73
BH CV STRESS HR STAGE 2: 79
BH CV STRESS NUCLEAR ISOTOPE DOSE: 31.4 MCI
BH CV STRESS PROTOCOL 1: NORMAL
BH CV STRESS RECOVERY BP: NORMAL MMHG
BH CV STRESS RECOVERY HR: 82 BPM
BH CV STRESS STAGE 1: 1
BH CV STRESS STAGE 2: 2
LV EF NUC BP: 60 %
MAXIMAL PREDICTED HEART RATE: 160 BPM
PERCENT MAX PREDICTED HR: 55 %
STRESS BASELINE BP: NORMAL MMHG
STRESS BASELINE HR: 73 BPM
STRESS PERCENT HR: 65 %
STRESS POST PEAK BP: NORMAL MMHG
STRESS POST PEAK HR: 88 BPM
STRESS TARGET HR: 136 BPM

## 2024-10-30 PROCEDURE — 25010000002 REGADENOSON 0.4 MG/5ML SOLUTION: Performed by: NURSE PRACTITIONER

## 2024-10-30 PROCEDURE — 0 TECHNETIUM TETROFOSMIN KIT: Performed by: FAMILY MEDICINE

## 2024-10-30 PROCEDURE — 0 TECHNETIUM SESTAMIBI: Performed by: FAMILY MEDICINE

## 2024-10-30 PROCEDURE — 93017 CV STRESS TEST TRACING ONLY: CPT

## 2024-10-30 PROCEDURE — 78452 HT MUSCLE IMAGE SPECT MULT: CPT

## 2024-10-30 PROCEDURE — 93306 TTE W/DOPPLER COMPLETE: CPT

## 2024-10-30 PROCEDURE — 0 TECHNETIUM SESTAMIBI: Performed by: NURSE PRACTITIONER

## 2024-10-30 PROCEDURE — 93356 MYOCRD STRAIN IMG SPCKL TRCK: CPT

## 2024-10-30 PROCEDURE — A9500 TC99M SESTAMIBI: HCPCS | Performed by: FAMILY MEDICINE

## 2024-10-30 PROCEDURE — A9502 TC99M TETROFOSMIN: HCPCS | Performed by: FAMILY MEDICINE

## 2024-10-30 PROCEDURE — A9500 TC99M SESTAMIBI: HCPCS | Performed by: NURSE PRACTITIONER

## 2024-10-30 RX ORDER — METOPROLOL SUCCINATE 25 MG/1
25 TABLET, EXTENDED RELEASE ORAL DAILY
Qty: 90 TABLET | Refills: 0 | Status: SHIPPED | OUTPATIENT
Start: 2024-10-30

## 2024-10-30 RX ORDER — REGADENOSON 0.08 MG/ML
0.4 INJECTION, SOLUTION INTRAVENOUS
Status: COMPLETED | OUTPATIENT
Start: 2024-10-30 | End: 2024-10-30

## 2024-10-30 RX ADMIN — TECHNETIUM TC 99M SESTAMIBI 1 DOSE: 1 INJECTION INTRAVENOUS at 11:23

## 2024-10-30 RX ADMIN — REGADENOSON 0.4 MG: 0.08 INJECTION, SOLUTION INTRAVENOUS at 11:23

## 2024-10-30 RX ADMIN — TETROFOSMIN 1 DOSE: 1.38 INJECTION, POWDER, LYOPHILIZED, FOR SOLUTION INTRAVENOUS at 10:52

## 2024-10-30 RX ADMIN — TECHNETIUM TC 99M SESTAMIBI 1 DOSE: 1 INJECTION INTRAVENOUS at 10:58

## 2024-10-31 LAB
AORTIC DIMENSIONLESS INDEX: 0.93 (DI)
BH CV ECHO LEFT VENTRICLE GLOBAL LONGITUDINAL STRAIN: -19.4 %
BH CV ECHO MEAS - ACS: 2.3 CM
BH CV ECHO MEAS - AO MAX PG: 7.7 MMHG
BH CV ECHO MEAS - AO MEAN PG: 4 MMHG
BH CV ECHO MEAS - AO V2 MAX: 139 CM/SEC
BH CV ECHO MEAS - AO V2 VTI: 30.1 CM
BH CV ECHO MEAS - AVA(I,D): 3.5 CM2
BH CV ECHO MEAS - EDV(CUBED): 132.7 ML
BH CV ECHO MEAS - EDV(MOD-SP4): 89.9 ML
BH CV ECHO MEAS - EF(MOD-SP4): 56.2 %
BH CV ECHO MEAS - ESV(CUBED): 35.9 ML
BH CV ECHO MEAS - ESV(MOD-SP4): 39.4 ML
BH CV ECHO MEAS - FS: 35.3 %
BH CV ECHO MEAS - IVS/LVPW: 1.09 CM
BH CV ECHO MEAS - IVSD: 1.2 CM
BH CV ECHO MEAS - LA DIMENSION: 3.9 CM
BH CV ECHO MEAS - LAT PEAK E' VEL: 13.1 CM/SEC
BH CV ECHO MEAS - LV MASS(C)D: 227.4 GRAMS
BH CV ECHO MEAS - LV MAX PG: 6.7 MMHG
BH CV ECHO MEAS - LV MEAN PG: 3 MMHG
BH CV ECHO MEAS - LV V1 MAX: 129 CM/SEC
BH CV ECHO MEAS - LV V1 VTI: 27.6 CM
BH CV ECHO MEAS - LVIDD: 5.1 CM
BH CV ECHO MEAS - LVIDS: 3.3 CM
BH CV ECHO MEAS - LVOT AREA: 3.8 CM2
BH CV ECHO MEAS - LVOT DIAM: 2.2 CM
BH CV ECHO MEAS - LVPWD: 1.1 CM
BH CV ECHO MEAS - MED PEAK E' VEL: 9.3 CM/SEC
BH CV ECHO MEAS - MV A MAX VEL: 96.4 CM/SEC
BH CV ECHO MEAS - MV DEC SLOPE: 351.5 CM/SEC2
BH CV ECHO MEAS - MV DEC TIME: 0.3 SEC
BH CV ECHO MEAS - MV E MAX VEL: 80.2 CM/SEC
BH CV ECHO MEAS - MV E/A: 0.83
BH CV ECHO MEAS - MV MAX PG: 3.4 MMHG
BH CV ECHO MEAS - MV MEAN PG: 2 MMHG
BH CV ECHO MEAS - MV P1/2T: 88.3 MSEC
BH CV ECHO MEAS - MV V2 VTI: 24.1 CM
BH CV ECHO MEAS - MVA(P1/2T): 2.49 CM2
BH CV ECHO MEAS - MVA(VTI): 4.4 CM2
BH CV ECHO MEAS - PA V2 MAX: 116 CM/SEC
BH CV ECHO MEAS - PULM A REVS DUR: 0.09 SEC
BH CV ECHO MEAS - PULM A REVS VEL: 29 CM/SEC
BH CV ECHO MEAS - PULM DIAS VEL: 47.1 CM/SEC
BH CV ECHO MEAS - PULM S/D: 1.53
BH CV ECHO MEAS - PULM SYS VEL: 72.2 CM/SEC
BH CV ECHO MEAS - QP/QS: 0.35
BH CV ECHO MEAS - RV MAX PG: 3.2 MMHG
BH CV ECHO MEAS - RV V1 MAX: 89.4 CM/SEC
BH CV ECHO MEAS - RV V1 VTI: 20.5 CM
BH CV ECHO MEAS - RVDD: 3.5 CM
BH CV ECHO MEAS - RVOT DIAM: 1.5 CM
BH CV ECHO MEAS - SV(LVOT): 104.9 ML
BH CV ECHO MEAS - SV(MOD-SP4): 50.5 ML
BH CV ECHO MEAS - SV(RVOT): 36.2 ML
BH CV ECHO MEAS - TAPSE (>1.6): 2.24 CM
BH CV ECHO MEASUREMENTS AVERAGE E/E' RATIO: 7.16
BH CV XLRA - TDI S': 14.8 CM/SEC
SINUS: 3.3 CM

## 2024-11-06 RX ORDER — DILTIAZEM HYDROCHLORIDE 180 MG/1
CAPSULE, COATED, EXTENDED RELEASE ORAL DAILY
Qty: 90 CAPSULE | Refills: 1 | Status: SHIPPED | OUTPATIENT
Start: 2024-11-06

## 2024-12-16 ENCOUNTER — OFFICE VISIT (OUTPATIENT)
Dept: CARDIOLOGY | Facility: CLINIC | Age: 59
End: 2024-12-16
Payer: COMMERCIAL

## 2024-12-16 VITALS
DIASTOLIC BLOOD PRESSURE: 82 MMHG | RESPIRATION RATE: 18 BRPM | BODY MASS INDEX: 42.82 KG/M2 | WEIGHT: 257 LBS | HEART RATE: 79 BPM | HEIGHT: 65 IN | SYSTOLIC BLOOD PRESSURE: 136 MMHG | OXYGEN SATURATION: 99 %

## 2024-12-16 DIAGNOSIS — R07.89 OTHER CHEST PAIN: Primary | ICD-10-CM

## 2024-12-16 DIAGNOSIS — I10 ESSENTIAL HYPERTENSION: ICD-10-CM

## 2024-12-16 DIAGNOSIS — R94.39 ABNORMAL STRESS TEST: ICD-10-CM

## 2024-12-16 DIAGNOSIS — I48.0 PAROXYSMAL ATRIAL FIBRILLATION: ICD-10-CM

## 2024-12-16 PROCEDURE — 93000 ELECTROCARDIOGRAM COMPLETE: CPT | Performed by: NURSE PRACTITIONER

## 2024-12-16 PROCEDURE — 99214 OFFICE O/P EST MOD 30 MIN: CPT | Performed by: NURSE PRACTITIONER

## 2024-12-16 RX ORDER — NITROGLYCERIN 0.4 MG/1
0.8 TABLET SUBLINGUAL
OUTPATIENT
Start: 2024-12-16

## 2024-12-16 RX ORDER — METOPROLOL TARTRATE 25 MG/1
50 TABLET, FILM COATED ORAL ONCE
OUTPATIENT
Start: 2024-12-16

## 2024-12-16 RX ORDER — SODIUM CHLORIDE 0.9 % (FLUSH) 0.9 %
10 SYRINGE (ML) INJECTION EVERY 12 HOURS SCHEDULED
OUTPATIENT
Start: 2024-12-16

## 2024-12-16 RX ORDER — METOPROLOL TARTRATE 25 MG/1
200 TABLET, FILM COATED ORAL ONCE
OUTPATIENT
Start: 2024-12-16 | End: 2024-12-16

## 2024-12-16 RX ORDER — METOPROLOL TARTRATE 25 MG/1
100 TABLET, FILM COATED ORAL ONCE
OUTPATIENT
Start: 2024-12-16

## 2024-12-16 RX ORDER — SODIUM CHLORIDE 0.9 % (FLUSH) 0.9 %
10 SYRINGE (ML) INJECTION AS NEEDED
OUTPATIENT
Start: 2024-12-16

## 2024-12-16 RX ORDER — METOPROLOL TARTRATE 25 MG/1
25 TABLET, FILM COATED ORAL ONCE
OUTPATIENT
Start: 2024-12-16

## 2024-12-16 RX ORDER — SODIUM CHLORIDE 9 MG/ML
40 INJECTION, SOLUTION INTRAVENOUS AS NEEDED
OUTPATIENT
Start: 2024-12-16

## 2024-12-16 RX ORDER — METOPROLOL TARTRATE 50 MG
TABLET ORAL
Qty: 2 TABLET | Refills: 0 | Status: SHIPPED | OUTPATIENT
Start: 2024-12-16

## 2024-12-16 RX ORDER — NITROGLYCERIN 0.4 MG/1
0.4 TABLET SUBLINGUAL
OUTPATIENT
Start: 2024-12-16 | End: 2024-12-16

## 2024-12-16 RX ORDER — GLYBURIDE 1.25 MG/1
1 TABLET ORAL EVERY 12 HOURS SCHEDULED
COMMUNITY
Start: 2024-11-01

## 2024-12-16 RX ORDER — METOPROLOL TARTRATE 1 MG/ML
5 INJECTION, SOLUTION INTRAVENOUS
OUTPATIENT
Start: 2024-12-16

## 2024-12-16 RX ORDER — METOPROLOL TARTRATE 50 MG
50 TABLET ORAL
OUTPATIENT
Start: 2024-12-16

## 2024-12-16 RX ORDER — METOPROLOL TARTRATE 25 MG/1
150 TABLET, FILM COATED ORAL ONCE
OUTPATIENT
Start: 2024-12-16

## 2024-12-16 NOTE — PROGRESS NOTES
Cardiology Office Follow Up Visit      Primary Care Provider:  Betsy Pham MD    Reason for f/u:     Follow up test results, chest pain, pre syncope      Subjective     CC:    Follow up test results    History of Present Illness       Bertha Peralta is a 59 y.o. female who is a patient of Dr. Barr. Pmh includes paroxysmal atrial fibrillation, HTN, DM, gastric bypass. She is s/p prior cardioversion for atrial fibrillation. She is on Xarelto for anticoagulation. She had a prior stress test in 2017 which showed no ischemia. She has not had work up since.     She presented in follow up in October and had some episodes of lightheadedness and chest pressure. A stress test and ECHO was ordered.    In October she underwent ECHO and stress  ECHO revealed normal LVEF, no significant VHD.  Stress test showed Myocardial perfusion imaging indicates a small-to-moderate-sized, mildly severe area of ischemia located in the inferior wall. Stress and rest imaging compared, multiple tomographic images compared, intermediate risk study There is decreased counts in the inferior wall concerning for mild to moderate ischemia compared to resting images, baseline decreased in these regions also exist with diaphragmatic GI attenuation, intermediate restudy with summed difference score 4, EF 60%, normal wall motion.    She presents today to review results. She reports some chest heaviness or pressure but she is still active, caring for grandchildren. We discussed stress test results, she prefers to not pursue LHC at this time but we discussed coronary CTA    ASSESSMENT/PLAN:      Diagnoses and all orders for this visit:    1. Other chest pain (Primary)  -     CT Angiogram Coronary; Future  -     CT Angiogram Coronary-Cardiology Interpretation; Future  -     metoprolol tartrate (LOPRESSOR) tablet 200 mg  -     metoprolol tartrate (LOPRESSOR) tablet 150 mg  -     metoprolol tartrate (LOPRESSOR) tablet 100 mg  -     metoprolol tartrate  (LOPRESSOR) tablet 50 mg  -     metoprolol tartrate (LOPRESSOR) tablet 25 mg  -     metoprolol tartrate (LOPRESSOR) tablet 50 mg  -     metoprolol tartrate (LOPRESSOR) injection 5 mg  -     nitroglycerin (NITROSTAT) SL tablet 0.4 mg  -     nitroglycerin (NITROSTAT) SL tablet 0.8 mg  -     sodium chloride 0.9 % flush 10 mL  -     sodium chloride 0.9 % flush 10 mL  -     sodium chloride 0.9 % infusion 40 mL    2. Abnormal stress test  -     CT Angiogram Coronary; Future  -     CT Angiogram Coronary-Cardiology Interpretation; Future  -     metoprolol tartrate (LOPRESSOR) tablet 200 mg  -     metoprolol tartrate (LOPRESSOR) tablet 150 mg  -     metoprolol tartrate (LOPRESSOR) tablet 100 mg  -     metoprolol tartrate (LOPRESSOR) tablet 50 mg  -     metoprolol tartrate (LOPRESSOR) tablet 25 mg  -     metoprolol tartrate (LOPRESSOR) tablet 50 mg  -     metoprolol tartrate (LOPRESSOR) injection 5 mg  -     nitroglycerin (NITROSTAT) SL tablet 0.4 mg  -     nitroglycerin (NITROSTAT) SL tablet 0.8 mg  -     sodium chloride 0.9 % flush 10 mL  -     sodium chloride 0.9 % flush 10 mL  -     sodium chloride 0.9 % infusion 40 mL    3. Essential hypertension    4. Paroxysmal atrial fibrillation    Other orders  -     No Caffeine or Nicotine 4 Hours Prior to CTA Appointment  -     Nothing to Eat or Drink 4 Hours Prior to CTA Appointment  -     Do Not Take Phosphodiasterase Inhibitors in the 72 Hours Prior to Coronary CTA  -     Obtain Informed Consent - Computed Tomography Angiography of Chest - Angiogram of Coronary Arteries; Standing  -     Vital Signs Upon Arrival; Standing  -     Cardiac Monitoring; Standing  -     Verify NPO Status - Patient to Be NPO at Least 4 Hours Prior to CTA; Standing  -     Notify CT After Administration of metoprolol tartrate (LOPRESSOR) tablet; Standing  -     Notify Provider If Total Metoprolol Given Equals 300mg & Heart Rate Not At Goal; Standing  -     Notify Provider Prior to Administration of  Nitroglycerin if Patient SBP <80; Standing  -     POC Creatinine; Standing  -     Insert Peripheral IV; Standing  -     Saline Lock & Maintain IV Access; Standing  -     Vital Signs - See Instructions; Standing  -     Hold Medication Metformin (Glucophage, Glucophage XR, Fortament, Glumetza);  Metglip (metformin/glipizide);  Glucovance (metformin/glyburide); Avandamet (metformin/rosiglitazone); Standing  -     Patient May Discharge Home After Procedure Complete (If Stable); Standing  -     metoprolol tartrate (LOPRESSOR) 50 MG tablet; Take 50 mg at Bedtime the Night Before Coronary CTA Appointment and In the Morning 1 Hour Prior to Coronary CTA Appointment. Do not take if heart rate less than 60.  Dispense: 2 tablet; Refill: 0            MEDICAL DECISION MAKING:  Discussed ECHO and stress test. Will pursue coronary CTA. Continue medical therapy to include xarelto for a/c for afib. Continue b/p medications and Cardizem / metoprolol. She is also on clonidine. Will f/u on coronary CTA results, if there is concern will pursue [facility]. Will keep scheduled f/u.          Past Medical History:   Diagnosis Date    AF (paroxysmal atrial fibrillation)     Coronary artery disease     Essential hypertension 7/21/2017    Gastroesophageal reflux disease 3/23/2020    GERD (gastroesophageal reflux disease)     Paroxysmal atrial fibrillation 3/23/2020    Sleep apnea 3/23/2020       Past Surgical History:   Procedure Laterality Date    BREAST BIOPSY Right     CORONARY ARTERY BYPASS GRAFT      HYSTERECTOMY           Current Outpatient Medications:     acetaminophen-codeine (TYLENOL with CODEINE #3) 300-30 MG per tablet, Take 1 tablet by mouth Every 4 (Four) Hours As Needed for Moderate Pain or Severe Pain., Disp: 15 tablet, Rfl: 0    aspirin 81 MG chewable tablet, Chew 1 tablet Daily. (Patient taking differently: Chew 1 tablet Daily. 325 pharmacy was out), Disp: , Rfl:     cetirizine (zyrTEC) 10 MG tablet, Take 1 tablet by mouth Daily.,  Disp: , Rfl:     cholecalciferol (VITAMIN D3) 1000 units tablet, Take 1 tablet by mouth Daily., Disp: , Rfl:     cloNIDine (CATAPRES) 0.1 MG tablet, Take 1 tablet by mouth Daily., Disp: , Rfl:     colesevelam (WELCHOL) 625 MG tablet, Take 1 tablet by mouth Daily., Disp: , Rfl:     cyanocobalamin 1000 MCG/ML injection, Every 30 (Thirty) Days., Disp: , Rfl:     cyclobenzaprine (FLEXERIL) 10 MG tablet, Take 1 tablet by mouth 2 (Two) Times a Day., Disp: , Rfl:     dilTIAZem CD (CARDIZEM CD) 180 MG 24 hr capsule, TAKE 1 CAPSULE BY MOUTH DAILY, Disp: 90 capsule, Rfl: 1    Farxiga 5 MG tablet tablet, Take 1 tablet by mouth Daily., Disp: , Rfl:     FLUoxetine (PROzac) 20 MG capsule, Take 1 capsule by mouth Daily., Disp: , Rfl:     FLUoxetine (PROzac) 40 MG capsule, 1 capsule Daily., Disp: , Rfl:     glyburide (DIAbeta) 1.25 MG tablet, Take 1 tablet by mouth Every 12 (Twelve) Hours., Disp: , Rfl:     Magnesium 250 MG tablet, Take 1 tablet by mouth Daily., Disp: , Rfl:     metFORMIN (GLUCOPHAGE) 500 MG tablet, Take 1 tablet by mouth 2 (Two) Times a Day With Meals. (Patient taking differently: Take 1 tablet by mouth 3 (Three) Times a Day.), Disp: , Rfl:     metoclopramide (REGLAN) 10 MG tablet, Take 1 tablet by mouth Every 12 (Twelve) Hours., Disp: , Rfl:     metoprolol succinate XL (TOPROL-XL) 25 MG 24 hr tablet, TAKE 1 TABLET BY MOUTH DAILY, Disp: 90 tablet, Rfl: 0    omeprazole (priLOSEC) 40 MG capsule, Take 1 capsule by mouth Daily., Disp: , Rfl:     Ozempic, 0.25 or 0.5 MG/DOSE, 2 MG/1.5ML solution pen-injector, Inject 0.5 mg under the skin into the appropriate area as directed 1 (One) Time Per Week., Disp: , Rfl:     pantoprazole (PROTONIX) 40 MG EC tablet, Take 1 tablet by mouth Every Morning., Disp: , Rfl:     tolterodine (DETROL) 2 MG tablet, Take 1 tablet by mouth Daily., Disp: , Rfl:     Xarelto 20 MG tablet, TAKE 1 TABLET BY MOUTH DAILY, Disp: 30 tablet, Rfl: 11    metoprolol tartrate (LOPRESSOR) 50 MG tablet, Take  "50 mg at Bedtime the Night Before Coronary CTA Appointment and In the Morning 1 Hour Prior to Coronary CTA Appointment. Do not take if heart rate less than 60., Disp: 2 tablet, Rfl: 0    Social History     Socioeconomic History    Marital status:    Tobacco Use    Smoking status: Never     Passive exposure: Never    Smokeless tobacco: Never   Vaping Use    Vaping status: Never Used   Substance and Sexual Activity    Alcohol use: Not Currently    Drug use: Never    Sexual activity: Defer       Family History   Problem Relation Age of Onset    Breast cancer Mother 35    Breast cancer Maternal Grandmother        The following portions of the patient's history were reviewed and updated as appropriate: allergies, current medications, past family history, past medical history, past social history, past surgical history and problem list.    Review of Systems   Constitutional: Negative for chills, diaphoresis and malaise/fatigue.   Cardiovascular:  Positive for chest pain. Negative for dyspnea on exertion, irregular heartbeat, leg swelling, near-syncope, orthopnea, palpitations, paroxysmal nocturnal dyspnea and syncope.   Respiratory:  Negative for cough, shortness of breath, sleep disturbances due to breathing and sputum production.    Gastrointestinal:  Negative for change in bowel habit.   Genitourinary:  Negative for urgency.   Neurological:  Negative for dizziness and headaches.   Psychiatric/Behavioral:  Negative for altered mental status.        Pertinent items are noted in HPI, all other systems reviewed and negative    /82 (BP Location: Right arm, Patient Position: Sitting, Cuff Size: Large Adult)   Pulse 79   Resp 18   Ht 165.1 cm (65\")   Wt 117 kg (257 lb)   SpO2 99%   BMI 42.77 kg/m² .  Objective     Constitutional:       Appearance: Not in distress.   Neck:      Vascular: JVD normal.   Pulmonary:      Effort: Pulmonary effort is normal.      Breath sounds: Normal breath sounds. "   Cardiovascular:      Normal rate. Regular rhythm.   Pulses:     Intact distal pulses.   Edema:     Peripheral edema absent.   Abdominal:      General: Bowel sounds are normal.      Palpations: Abdomen is soft.   Musculoskeletal: Normal range of motion. Skin:     General: Skin is warm and dry.   Neurological:      General: No focal deficit present.      Mental Status: Oriented to person, place and time.             ECG 12 Lead    Date/Time: 12/16/2024 10:39 AM  Performed by: Vanda Avery APRN    Authorized by: Vanda Avery APRN  Comparison: not compared with previous ECG   Previous ECG: no previous ECG available  Rhythm: sinus rhythm  Rate: normal  BPM: 79  Other findings: non-specific ST-T wave changes          EKG ordered by and reviewed by me in office

## 2024-12-31 RX ORDER — METOPROLOL SUCCINATE 25 MG/1
25 TABLET, EXTENDED RELEASE ORAL DAILY
Qty: 90 TABLET | Refills: 3 | Status: SHIPPED | OUTPATIENT
Start: 2024-12-31

## 2025-01-14 RX ORDER — DILTIAZEM HYDROCHLORIDE 180 MG/1
180 CAPSULE, COATED, EXTENDED RELEASE ORAL DAILY
COMMUNITY

## 2025-01-14 RX ORDER — GUAIFENESIN 600 MG/1
1200 TABLET, EXTENDED RELEASE ORAL 2 TIMES DAILY
COMMUNITY

## 2025-01-14 RX ORDER — ASPIRIN 81 MG/1
81 TABLET ORAL DAILY
COMMUNITY

## 2025-01-17 ENCOUNTER — HOSPITAL ENCOUNTER (OUTPATIENT)
Dept: CT IMAGING | Facility: HOSPITAL | Age: 60
Discharge: HOME | End: 2025-01-17
Admitting: NURSE PRACTITIONER
Payer: COMMERCIAL

## 2025-01-17 VITALS
RESPIRATION RATE: 14 BRPM | DIASTOLIC BLOOD PRESSURE: 65 MMHG | SYSTOLIC BLOOD PRESSURE: 100 MMHG | OXYGEN SATURATION: 97 % | HEART RATE: 67 BPM

## 2025-01-17 DIAGNOSIS — R07.89 OTHER CHEST PAIN: ICD-10-CM

## 2025-01-17 DIAGNOSIS — R94.39 ABNORMAL STRESS TEST: ICD-10-CM

## 2025-01-17 LAB
CREAT BLDA-MCNC: 0.9 MG/DL (ref 0.6–1.3)
EGFRCR SERPLBLD CKD-EPI 2021: 72.9 ML/MIN/1.73

## 2025-01-17 PROCEDURE — 75574 CT ANGIO HRT W/3D IMAGE: CPT

## 2025-01-17 PROCEDURE — 82565 ASSAY OF CREATININE: CPT

## 2025-01-17 PROCEDURE — 25510000001 IOPAMIDOL PER 1 ML: Performed by: NURSE PRACTITIONER

## 2025-01-17 RX ORDER — METOPROLOL TARTRATE 1 MG/ML
5 INJECTION, SOLUTION INTRAVENOUS
Status: DISCONTINUED | OUTPATIENT
Start: 2025-01-17 | End: 2025-01-18 | Stop reason: HOSPADM

## 2025-01-17 RX ORDER — METOPROLOL TARTRATE 50 MG
50 TABLET ORAL
Status: DISCONTINUED | OUTPATIENT
Start: 2025-01-17 | End: 2025-01-18 | Stop reason: HOSPADM

## 2025-01-17 RX ORDER — METOPROLOL TARTRATE 50 MG
150 TABLET ORAL ONCE
Status: DISCONTINUED | OUTPATIENT
Start: 2025-01-17 | End: 2025-01-18 | Stop reason: HOSPADM

## 2025-01-17 RX ORDER — NITROGLYCERIN 0.4 MG/1
0.8 TABLET SUBLINGUAL
Status: COMPLETED | OUTPATIENT
Start: 2025-01-17 | End: 2025-01-17

## 2025-01-17 RX ORDER — METOPROLOL TARTRATE 50 MG
200 TABLET ORAL ONCE
Status: DISCONTINUED | OUTPATIENT
Start: 2025-01-17 | End: 2025-01-18 | Stop reason: HOSPADM

## 2025-01-17 RX ORDER — SODIUM CHLORIDE 0.9 % (FLUSH) 0.9 %
10 SYRINGE (ML) INJECTION AS NEEDED
Status: DISCONTINUED | OUTPATIENT
Start: 2025-01-17 | End: 2025-01-18 | Stop reason: HOSPADM

## 2025-01-17 RX ORDER — SODIUM CHLORIDE 9 MG/ML
40 INJECTION, SOLUTION INTRAVENOUS AS NEEDED
Status: DISCONTINUED | OUTPATIENT
Start: 2025-01-17 | End: 2025-01-18 | Stop reason: HOSPADM

## 2025-01-17 RX ORDER — NITROGLYCERIN 0.4 MG/1
0.4 TABLET SUBLINGUAL
Status: COMPLETED | OUTPATIENT
Start: 2025-01-17 | End: 2025-01-17

## 2025-01-17 RX ORDER — SODIUM CHLORIDE 0.9 % (FLUSH) 0.9 %
10 SYRINGE (ML) INJECTION EVERY 12 HOURS SCHEDULED
Status: DISCONTINUED | OUTPATIENT
Start: 2025-01-17 | End: 2025-01-18 | Stop reason: HOSPADM

## 2025-01-17 RX ORDER — METOPROLOL TARTRATE 50 MG
100 TABLET ORAL ONCE
Status: DISCONTINUED | OUTPATIENT
Start: 2025-01-17 | End: 2025-01-18 | Stop reason: HOSPADM

## 2025-01-17 RX ORDER — METOPROLOL TARTRATE 50 MG
50 TABLET ORAL ONCE
Status: DISCONTINUED | OUTPATIENT
Start: 2025-01-17 | End: 2025-01-18 | Stop reason: HOSPADM

## 2025-01-17 RX ORDER — IOPAMIDOL 755 MG/ML
100 INJECTION, SOLUTION INTRAVASCULAR
Status: COMPLETED | OUTPATIENT
Start: 2025-01-17 | End: 2025-01-17

## 2025-01-17 RX ORDER — METOPROLOL TARTRATE 25 MG/1
25 TABLET, FILM COATED ORAL ONCE
Status: DISCONTINUED | OUTPATIENT
Start: 2025-01-17 | End: 2025-01-18 | Stop reason: HOSPADM

## 2025-01-17 RX ADMIN — NITROGLYCERIN 0.8 MG: 0.4 TABLET SUBLINGUAL at 15:29

## 2025-01-17 RX ADMIN — IOPAMIDOL 100 ML: 755 INJECTION, SOLUTION INTRAVENOUS at 15:35

## 2025-01-17 NOTE — NURSING NOTE
Pt arrived.  CT machine was down.  Pt was going to reschedule.  CT machine came back up and pt decided to stay. Attempted to start IV by [facility] RNs, unable to do so.  Contacted PICC team.  They arrived about two and a half hours after being first contacted, and were able to get a 20 gauge in R AC with US assistance. Pt's creatinine normal, and pt taken to CT around 1515.  See MAR for meds. PT instructed to increase fluid intake.  IV DCd at 1615 and pt DCd from [facility] at 1620.  Dr. Mullins notified of completed test.

## 2025-01-20 ENCOUNTER — HOSPITAL ENCOUNTER (OUTPATIENT)
Dept: CT IMAGING | Facility: HOSPITAL | Age: 60
Discharge: HOME | End: 2025-01-20
Payer: COMMERCIAL

## 2025-01-20 DIAGNOSIS — R94.39 ABNORMAL STRESS TEST: ICD-10-CM

## 2025-01-20 DIAGNOSIS — R07.89 OTHER CHEST PAIN: ICD-10-CM

## 2025-01-28 RX ORDER — ATORVASTATIN CALCIUM 20 MG/1
20 TABLET, FILM COATED ORAL DAILY
Qty: 90 TABLET | Refills: 1 | Status: SHIPPED | OUTPATIENT
Start: 2025-01-28

## 2025-03-13 ENCOUNTER — OFFICE (AMBULATORY)
Dept: URBAN - METROPOLITAN AREA CLINIC 64 | Facility: CLINIC | Age: 60
End: 2025-03-13
Payer: COMMERCIAL

## 2025-03-13 VITALS
DIASTOLIC BLOOD PRESSURE: 55 MMHG | WEIGHT: 256 LBS | HEART RATE: 72 BPM | HEIGHT: 65 IN | SYSTOLIC BLOOD PRESSURE: 111 MMHG

## 2025-03-13 DIAGNOSIS — R10.30 LOWER ABDOMINAL PAIN, UNSPECIFIED: ICD-10-CM

## 2025-03-13 DIAGNOSIS — R15.2 FECAL URGENCY: ICD-10-CM

## 2025-03-13 DIAGNOSIS — R13.10 DYSPHAGIA, UNSPECIFIED: ICD-10-CM

## 2025-03-13 DIAGNOSIS — R19.7 DIARRHEA, UNSPECIFIED: ICD-10-CM

## 2025-03-13 DIAGNOSIS — K21.9 GASTRO-ESOPHAGEAL REFLUX DISEASE WITHOUT ESOPHAGITIS: ICD-10-CM

## 2025-03-13 DIAGNOSIS — K64.1 SECOND DEGREE HEMORRHOIDS: ICD-10-CM

## 2025-03-13 PROCEDURE — 99214 OFFICE O/P EST MOD 30 MIN: CPT

## 2025-03-13 RX ORDER — CHOLESTYRAMINE 4 G/9G
8 POWDER, FOR SUSPENSION ORAL
Qty: 60 | Refills: 12 | Status: ACTIVE
Start: 2025-03-13

## 2025-03-14 LAB
C-REACTIVE PROTEIN, QUANT: 5 MG/L (ref 0–10)
CBC WITH DIFFERENTIAL/PLATELET: BASO (ABSOLUTE): 0.1 X10E3/UL (ref 0–0.2)
CBC WITH DIFFERENTIAL/PLATELET: BASOS: 1 %
CBC WITH DIFFERENTIAL/PLATELET: EOS (ABSOLUTE): 0.1 X10E3/UL (ref 0–0.4)
CBC WITH DIFFERENTIAL/PLATELET: EOS: 2 %
CBC WITH DIFFERENTIAL/PLATELET: HEMATOCRIT: 38.9 % (ref 34–46.6)
CBC WITH DIFFERENTIAL/PLATELET: HEMATOLOGY COMMENTS: (no result)
CBC WITH DIFFERENTIAL/PLATELET: HEMOGLOBIN: 12 G/DL (ref 11.1–15.9)
CBC WITH DIFFERENTIAL/PLATELET: IMMATURE CELLS: (no result)
CBC WITH DIFFERENTIAL/PLATELET: IMMATURE GRANS (ABS): 0 X10E3/UL (ref 0–0.1)
CBC WITH DIFFERENTIAL/PLATELET: IMMATURE GRANULOCYTES: 1 %
CBC WITH DIFFERENTIAL/PLATELET: LYMPHS (ABSOLUTE): 3.2 X10E3/UL — HIGH (ref 0.7–3.1)
CBC WITH DIFFERENTIAL/PLATELET: LYMPHS: 39 %
CBC WITH DIFFERENTIAL/PLATELET: MCH: 25.5 PG — LOW (ref 26.6–33)
CBC WITH DIFFERENTIAL/PLATELET: MCHC: 30.8 G/DL — LOW (ref 31.5–35.7)
CBC WITH DIFFERENTIAL/PLATELET: MCV: 83 FL (ref 79–97)
CBC WITH DIFFERENTIAL/PLATELET: MONOCYTES(ABSOLUTE): 0.6 X10E3/UL (ref 0.1–0.9)
CBC WITH DIFFERENTIAL/PLATELET: MONOCYTES: 7 %
CBC WITH DIFFERENTIAL/PLATELET: NEUTROPHILS (ABSOLUTE): 4.2 X10E3/UL (ref 1.4–7)
CBC WITH DIFFERENTIAL/PLATELET: NEUTROPHILS: 50 %
CBC WITH DIFFERENTIAL/PLATELET: NRBC: (no result)
CBC WITH DIFFERENTIAL/PLATELET: PLATELETS: 440 X10E3/UL (ref 150–450)
CBC WITH DIFFERENTIAL/PLATELET: RBC: 4.71 X10E6/UL (ref 3.77–5.28)
CBC WITH DIFFERENTIAL/PLATELET: RDW: 16 % — HIGH (ref 11.7–15.4)
CBC WITH DIFFERENTIAL/PLATELET: WBC: 8.2 X10E3/UL (ref 3.4–10.8)
SEDIMENTATION RATE-WESTERGREN: 42 MM/HR — HIGH (ref 0–40)

## 2025-03-21 ENCOUNTER — OFFICE (AMBULATORY)
Dept: URBAN - METROPOLITAN AREA LAB 2 | Facility: LAB | Age: 60
End: 2025-03-21

## 2025-03-21 DIAGNOSIS — R19.7 DIARRHEA, UNSPECIFIED: ICD-10-CM

## 2025-03-21 PROCEDURE — 87324 CLOSTRIDIUM AG IA: CPT | Mod: 59

## 2025-03-21 PROCEDURE — 87449 NOS EACH ORGANISM AG IA: CPT

## 2025-03-23 LAB
CALPROTECTIN, FECAL: 220 UG/G — HIGH (ref 0–120)
GI PROFILE, STOOL, PCR: ADENOVIRUS F 40/41: NOT DETECTED
GI PROFILE, STOOL, PCR: ASTROVIRUS: NOT DETECTED
GI PROFILE, STOOL, PCR: C DIFFICILE TOXIN A/B: NOT DETECTED
GI PROFILE, STOOL, PCR: CAMPYLOBACTER: NOT DETECTED
GI PROFILE, STOOL, PCR: CRYPTOSPORIDIUM: NOT DETECTED
GI PROFILE, STOOL, PCR: CYCLOSPORA CAYETANENSIS: NOT DETECTED
GI PROFILE, STOOL, PCR: E COLI O157: (no result)
GI PROFILE, STOOL, PCR: ENTAMOEBA HISTOLYTICA: NOT DETECTED
GI PROFILE, STOOL, PCR: ENTEROAGGREGATIVE E COLI: NOT DETECTED
GI PROFILE, STOOL, PCR: ENTEROPATHOGENIC E COLI: NOT DETECTED
GI PROFILE, STOOL, PCR: ENTEROTOXIGENIC E COLI: NOT DETECTED
GI PROFILE, STOOL, PCR: GIARDIA LAMBLIA: NOT DETECTED
GI PROFILE, STOOL, PCR: NOROVIRUS GI/GII: NOT DETECTED
GI PROFILE, STOOL, PCR: PLESIOMONAS SHIGELLOIDES: NOT DETECTED
GI PROFILE, STOOL, PCR: ROTAVIRUS A: NOT DETECTED
GI PROFILE, STOOL, PCR: SALMONELLA: NOT DETECTED
GI PROFILE, STOOL, PCR: SAPOVIRUS: NOT DETECTED
GI PROFILE, STOOL, PCR: SHIGA-TOXIN-PRODUCING E COLI: NOT DETECTED
GI PROFILE, STOOL, PCR: SHIGELLA/ENTEROINVASIVE E COLI: NOT DETECTED
GI PROFILE, STOOL, PCR: VIBRIO CHOLERAE: NOT DETECTED
GI PROFILE, STOOL, PCR: VIBRIO: NOT DETECTED
GI PROFILE, STOOL, PCR: YERSINIA ENTEROCOLITICA: NOT DETECTED

## 2025-03-25 ENCOUNTER — TRANSCRIBE ORDERS (OUTPATIENT)
Dept: ADMINISTRATIVE | Facility: HOSPITAL | Age: 60
End: 2025-03-25
Payer: COMMERCIAL

## 2025-03-25 DIAGNOSIS — N95.9 MENOPAUSAL AND POSTMENOPAUSAL DISORDER: ICD-10-CM

## 2025-03-25 DIAGNOSIS — Z00.00 ROUTINE GENERAL MEDICAL EXAMINATION AT A HEALTH CARE FACILITY: Primary | ICD-10-CM

## 2025-04-03 LAB
NCCN CRITERIA FLAG: ABNORMAL
TYRER CUZICK SCORE: 8.6

## 2025-04-04 ENCOUNTER — OFFICE (AMBULATORY)
Dept: URBAN - METROPOLITAN AREA CLINIC 64 | Facility: CLINIC | Age: 60
End: 2025-04-04

## 2025-04-04 ENCOUNTER — HOSPITAL ENCOUNTER (OUTPATIENT)
Dept: MAMMOGRAPHY | Facility: HOSPITAL | Age: 60
Discharge: HOME | End: 2025-04-04
Payer: COMMERCIAL

## 2025-04-04 ENCOUNTER — HOSPITAL ENCOUNTER (OUTPATIENT)
Dept: BONE DENSITY | Facility: HOSPITAL | Age: 60
Discharge: HOME | End: 2025-04-04
Payer: COMMERCIAL

## 2025-04-04 VITALS
HEART RATE: 70 BPM | SYSTOLIC BLOOD PRESSURE: 137 MMHG | HEIGHT: 65 IN | DIASTOLIC BLOOD PRESSURE: 68 MMHG | WEIGHT: 262 LBS

## 2025-04-04 DIAGNOSIS — R19.5 OTHER FECAL ABNORMALITIES: ICD-10-CM

## 2025-04-04 DIAGNOSIS — K21.9 GASTRO-ESOPHAGEAL REFLUX DISEASE WITHOUT ESOPHAGITIS: ICD-10-CM

## 2025-04-04 DIAGNOSIS — K64.1 SECOND DEGREE HEMORRHOIDS: ICD-10-CM

## 2025-04-04 DIAGNOSIS — N95.9 MENOPAUSAL AND POSTMENOPAUSAL DISORDER: ICD-10-CM

## 2025-04-04 DIAGNOSIS — R19.4 CHANGE IN BOWEL HABIT: ICD-10-CM

## 2025-04-04 DIAGNOSIS — Z00.00 ROUTINE GENERAL MEDICAL EXAMINATION AT A HEALTH CARE FACILITY: ICD-10-CM

## 2025-04-04 DIAGNOSIS — R13.10 DYSPHAGIA, UNSPECIFIED: ICD-10-CM

## 2025-04-04 DIAGNOSIS — Z79.01 LONG TERM (CURRENT) USE OF ANTICOAGULANTS: ICD-10-CM

## 2025-04-04 PROCEDURE — 77080 DXA BONE DENSITY AXIAL: CPT

## 2025-04-04 PROCEDURE — 99214 OFFICE O/P EST MOD 30 MIN: CPT

## 2025-04-04 PROCEDURE — 77067 SCR MAMMO BI INCL CAD: CPT

## 2025-04-04 PROCEDURE — 77063 BREAST TOMOSYNTHESIS BI: CPT

## 2025-04-04 RX ORDER — FAMOTIDINE 40 MG/1
40 TABLET, FILM COATED ORAL
Qty: 90 | Refills: 3 | Status: ACTIVE
Start: 2025-04-04

## 2025-04-15 DIAGNOSIS — Z13.79 GENETIC TESTING: Primary | ICD-10-CM

## 2025-04-18 LAB
IBD EXPANDED PANEL: ACCA: 39 UNITS (ref 0–90)
IBD EXPANDED PANEL: ALCA: 1 UNITS (ref 0–60)
IBD EXPANDED PANEL: AMCA: 6 UNITS (ref 0–100)
IBD EXPANDED PANEL: ATYPICAL PANCA: NEGATIVE
IBD EXPANDED PANEL: COMMENTS: (no result)
IBD EXPANDED PANEL: GASCA: 11 UNITS (ref 0–50)

## 2025-04-28 RX ORDER — DILTIAZEM HYDROCHLORIDE 180 MG/1
180 CAPSULE, COATED, EXTENDED RELEASE ORAL DAILY
Qty: 90 CAPSULE | Refills: 1 | Status: SHIPPED | OUTPATIENT
Start: 2025-04-28

## 2025-05-23 ENCOUNTER — ON CAMPUS - OUTPATIENT (AMBULATORY)
Dept: URBAN - METROPOLITAN AREA HOSPITAL 2 | Facility: HOSPITAL | Age: 60
End: 2025-05-23
Payer: COMMERCIAL

## 2025-05-23 ENCOUNTER — OFFICE (AMBULATORY)
Dept: URBAN - METROPOLITAN AREA PATHOLOGY 19 | Facility: PATHOLOGY | Age: 60
End: 2025-05-23
Payer: COMMERCIAL

## 2025-05-23 VITALS
HEIGHT: 65 IN | SYSTOLIC BLOOD PRESSURE: 120 MMHG | SYSTOLIC BLOOD PRESSURE: 132 MMHG | OXYGEN SATURATION: 99 % | DIASTOLIC BLOOD PRESSURE: 73 MMHG | HEART RATE: 82 BPM | SYSTOLIC BLOOD PRESSURE: 130 MMHG | DIASTOLIC BLOOD PRESSURE: 86 MMHG | HEART RATE: 78 BPM | OXYGEN SATURATION: 96 % | SYSTOLIC BLOOD PRESSURE: 105 MMHG | SYSTOLIC BLOOD PRESSURE: 116 MMHG | DIASTOLIC BLOOD PRESSURE: 60 MMHG | HEART RATE: 79 BPM | HEART RATE: 71 BPM | WEIGHT: 254 LBS | SYSTOLIC BLOOD PRESSURE: 113 MMHG | TEMPERATURE: 97.8 F | HEART RATE: 80 BPM | RESPIRATION RATE: 18 BRPM | HEART RATE: 74 BPM | DIASTOLIC BLOOD PRESSURE: 58 MMHG | DIASTOLIC BLOOD PRESSURE: 71 MMHG | OXYGEN SATURATION: 98 % | SYSTOLIC BLOOD PRESSURE: 121 MMHG | RESPIRATION RATE: 17 BRPM | DIASTOLIC BLOOD PRESSURE: 52 MMHG | SYSTOLIC BLOOD PRESSURE: 157 MMHG | HEART RATE: 75 BPM | RESPIRATION RATE: 16 BRPM | OXYGEN SATURATION: 97 %

## 2025-05-23 DIAGNOSIS — K22.2 ESOPHAGEAL OBSTRUCTION: ICD-10-CM

## 2025-05-23 DIAGNOSIS — K62.5 HEMORRHAGE OF ANUS AND RECTUM: ICD-10-CM

## 2025-05-23 DIAGNOSIS — D12.2 BENIGN NEOPLASM OF ASCENDING COLON: ICD-10-CM

## 2025-05-23 DIAGNOSIS — K64.1 SECOND DEGREE HEMORRHOIDS: ICD-10-CM

## 2025-05-23 DIAGNOSIS — R13.10 DYSPHAGIA, UNSPECIFIED: ICD-10-CM

## 2025-05-23 DIAGNOSIS — Z48.815 ENCOUNTER FOR SURGICAL AFTERCARE FOLLOWING SURGERY ON THE DI: ICD-10-CM

## 2025-05-23 PROCEDURE — 88305 TISSUE EXAM BY PATHOLOGIST: CPT | Performed by: PATHOLOGY

## 2025-05-23 PROCEDURE — 43450 DILATE ESOPHAGUS 1/MULT PASS: CPT | Performed by: INTERNAL MEDICINE

## 2025-05-23 PROCEDURE — 43235 EGD DIAGNOSTIC BRUSH WASH: CPT | Performed by: INTERNAL MEDICINE

## 2025-05-23 PROCEDURE — 45385 COLONOSCOPY W/LESION REMOVAL: CPT | Performed by: INTERNAL MEDICINE

## 2025-07-01 ENCOUNTER — TELEPHONE (OUTPATIENT)
Dept: CARDIOLOGY | Facility: CLINIC | Age: 60
End: 2025-07-01

## 2025-07-01 NOTE — TELEPHONE ENCOUNTER
Caller: Bertha Peralta    Relationship: Self    Best call back number: [phone]    What is the best time to reach you: ANYTIME    Who are you requesting to speak with (clinical staff, provider,  specific staff member): CLINICAL      What was the call regarding: PT CALLED ABOUT REFERRAL TO OPTIMAL RENEWAL.  SHE SAID YOU HAVE NOT APPROVED IT.   PLEASE CALL

## 2025-07-09 ENCOUNTER — OFFICE VISIT (OUTPATIENT)
Dept: CARDIOLOGY | Facility: CLINIC | Age: 60
End: 2025-07-09
Payer: COMMERCIAL

## 2025-07-09 VITALS
HEIGHT: 65 IN | OXYGEN SATURATION: 94 % | RESPIRATION RATE: 18 BRPM | DIASTOLIC BLOOD PRESSURE: 77 MMHG | HEART RATE: 71 BPM | SYSTOLIC BLOOD PRESSURE: 112 MMHG | WEIGHT: 258 LBS | BODY MASS INDEX: 42.99 KG/M2

## 2025-07-09 DIAGNOSIS — R94.39 ABNORMAL STRESS TEST: Primary | ICD-10-CM

## 2025-07-09 DIAGNOSIS — R06.09 DYSPNEA ON EXERTION: ICD-10-CM

## 2025-07-09 DIAGNOSIS — R07.9 CHEST PAIN, UNSPECIFIED TYPE: ICD-10-CM

## 2025-07-09 DIAGNOSIS — I48.0 PAROXYSMAL ATRIAL FIBRILLATION: ICD-10-CM

## 2025-07-09 DIAGNOSIS — I10 ESSENTIAL HYPERTENSION: ICD-10-CM

## 2025-07-09 DIAGNOSIS — R55 SYNCOPE AND COLLAPSE: ICD-10-CM

## 2025-07-09 PROCEDURE — 99214 OFFICE O/P EST MOD 30 MIN: CPT | Performed by: INTERNAL MEDICINE

## 2025-07-09 RX ORDER — COLESTIPOL HYDROCHLORIDE 1 G/1
2 TABLET ORAL EVERY 12 HOURS SCHEDULED
COMMUNITY
Start: 2025-03-25

## 2025-07-09 RX ORDER — FAMOTIDINE 40 MG/1
40 TABLET, FILM COATED ORAL
COMMUNITY
Start: 2025-04-04

## 2025-07-16 ENCOUNTER — OFFICE (AMBULATORY)
Dept: URBAN - METROPOLITAN AREA CLINIC 64 | Facility: CLINIC | Age: 60
End: 2025-07-16
Payer: COMMERCIAL

## 2025-07-16 VITALS
DIASTOLIC BLOOD PRESSURE: 71 MMHG | WEIGHT: 256 LBS | SYSTOLIC BLOOD PRESSURE: 111 MMHG | HEIGHT: 65 IN | HEART RATE: 72 BPM

## 2025-07-16 DIAGNOSIS — K62.5 HEMORRHAGE OF ANUS AND RECTUM: ICD-10-CM

## 2025-07-16 DIAGNOSIS — R19.4 CHANGE IN BOWEL HABIT: ICD-10-CM

## 2025-07-16 DIAGNOSIS — K21.9 GASTRO-ESOPHAGEAL REFLUX DISEASE WITHOUT ESOPHAGITIS: ICD-10-CM

## 2025-07-16 DIAGNOSIS — R13.10 DYSPHAGIA, UNSPECIFIED: ICD-10-CM

## 2025-07-16 DIAGNOSIS — R14.3 FLATULENCE: ICD-10-CM

## 2025-07-16 DIAGNOSIS — R14.0 ABDOMINAL DISTENSION (GASEOUS): ICD-10-CM

## 2025-07-16 PROCEDURE — 99214 OFFICE O/P EST MOD 30 MIN: CPT

## 2025-07-16 RX ORDER — HYDROCORTISONE ACETATE 25 MG/1
SUPPOSITORY RECTAL
Qty: 10 | Refills: 4 | Status: ACTIVE
Start: 2025-07-16

## 2025-07-17 NOTE — PROGRESS NOTES
Cardiology Clinic Note  Bull Barr MD, PhD    Subjective:     Encounter Date:07/09/2025      Patient ID: Bertha Peralta is a 59 y.o. female.    Chief Complaint:  Chief Complaint   Patient presents with    Follow-up       HPI:      I the pleasure to see this very pleasant 59-year-old female today with history of paroxysmal atrial fibrillation hypertension diabetes history of coronary disease, last stress echo was 2 years ago which revealed unremarkable findings, preserved EF with no new heart failure signs or symptoms no refractory angina her atrial fibrillation has been well controlled and she knows when she is having caroxazone says she can feel some tightness.  She has obesity at 270 pounds on last encounter down  258 now, we discussed diet weight loss per AHA guidelines and healthy exercise allowed by functional status which she is doing well..    EKG demonstrates sinus rhythm today     no new symptoms today on repeat encounter, no CV complaints, reassured by coronary CTA results        December 2024 for coronary CTA, calcium score of only 1.5, left main no disease normal takeoff, LAD 1 diagonal branch, no disease, ramus no disease, circumflex no obvious disease, RCA dominant with normal origin, no disease, EF 76%, normal cardiac valves trileaflet aortic valve, normal atrial sizes, normal aorta     Review of systems otherwise negative x14 point review of systems except as mentioned above     Historical data copied forward from previous encounters in EMR including the history, exam, and assessment/plan has been reviewed and is unchanged unless noted otherwise.     Cardiac medicines reviewed with risk, benefits, and necessity of each discussed.     Risk and benefit of cardiac testing reviewed including death heart attack stroke pain bleeding infection need for vascular /cardiovascular surgery were discussed and the patient      Review of systems negative x14 point review of systems except as mentioned above      Vitals reviewed  Weight down to 258 from 271  105/71 with heart rate to 70  Obese soft nontender nondistended bowel sounds are positive  Clear to auscultation  Regular rate and rhythm 90s no rubs gallops heaves lift  Distant heart sounds  No carotid bruits or JVD  Radial pulses 2+ equal bilateral  Normal cap refill skin is warm and dry  Unchanged from encounter      Coronary Artery Calcification Findings: 2024  The total calcium score is 1.5 indicating mild (CAC 1-100) calcified plaque in the coronary tree.  Left main: 0  LAD: 0  LCx: 1.5  RCA: 0     Angiographic Findings:  The coronary arteries are right dominant.  Left Main: The left main is a large caliber vessel with a normal take off from the left coronary cusp that bifurcates into LAD and LCx. There is no plaque or stenosis.   LAD: Patent with no evidence of plaque or stenosis. The LAD gives off 1 medium caliber patent diagonal branches without stenosis and wraps the apex.   Ramus: Medium caliber vessel, patent with no evidence of plaque or stenosis.  LCx: Small caliber vessel, minimal calcification.  Sub-optimal visualization of left circumflex vessel, but no obvious significant stenosis noted.  RCA: Normal origin from the right coronary cusp. Patent with no evidence of plaque or stenosis. The RCA terminates as a PDA and right posterolateral branch without evidence of plaque or stenosis.      Noncoronary Cardiac Findings:  Cardiac chambers: Normal in size with no obvious filling defects within the ventricles, atria, or atrial appendages. No stigmata or prior infarction.  Cardiac valves: Normal trileaflet aortic valve. No thickening or calcifications in the aortic and mitral valves.  Pulmonary arteries: Normal in caliber. No obvious filling defects in the visualized central pulmonary arteri(es) to suggest large central pulmonary emboli.   Pericardium: The pericardial contour is preserved with no effusion, thickening, or calcifications.  Left ventricle:  "Calculated LVEF 76%.  Aorta: Normal size, no significant calcification.     Assessment  Assessment plan per my encounter     Paroxysmal A. fib  Continue diltiazem  Continue metoprolol XL 25 daily  Aspirin presently low-dose  Continue Xarelto for stroke risk reduction with [id number] score of 3     Continue Farxiga  Clonidine 0.1/day as needed for hypertension     Diabetes goal A1c less than 7     Morbid obesity BMI greater than 40  Diet and exercise per HI guidelines with goal of 10% body weight reduction over the next 1 year     Essential hypertension, controlled at home, advance metoprolol as above  Goal blood pressure with diabetes less than 1 30-1 35 systolic     Coronary equivalent of diabetes, goal LDL less than 100 optimally less than 70  Coronary CTA with no disease in 2024, calcium score 1.4  Continue preventative health care        By cardiology in 18 months    Objective:         /77 (BP Location: Right arm, Patient Position: Sitting)   Pulse 71   Resp 18   Ht 165.1 cm (65\")   Wt 117 kg (258 lb)   SpO2 94%   BMI 42.93 kg/m²     Physical Exam    Assessment:         There are no diagnoses linked to this encounter.       Plan:              The pleasure to be involved in this patient's cardiovascular care.  Please call with any questions or concerns  Bull Barr MD, PhD    Most recent EKG as reviewed and interpreted by me:  Procedures     Most recent echo as reviewed and interpreted by me:  Results for orders placed during the hospital encounter of 10/30/24    Adult Transthoracic Echo Complete W/ Color, Spectral and Contrast if Necessary Per Protocol 10/31/2024 11:29 AM    Interpretation Summary    Left ventricular systolic function is normal. Left ventricular ejection fraction appears to be 56 - 60%.    Left ventricular diastolic function was normal.    Estimated right ventricular systolic pressure from tricuspid regurgitation is normal (<35 mmHg).      Most recent stress test as reviewed and " interpreted by me:  Results for orders placed during the hospital encounter of 10/30/24    Stress Test With Myocardial Perfusion (1 Day) 10/30/2024  6:19 PM    Interpretation Summary    Impressions are consistent with an intermediate risk study.    Left ventricular ejection fraction is normal (Calculated EF = 60%).    Myocardial perfusion imaging indicates a small-to-moderate-sized, mildly severe area of ischemia located in the inferior wall.    Stress and rest imaging compared, multiple tomographic images compared, intermediate risk study There is decreased counts in the inferior wall concerning for mild to moderate ischemia compared to resting images, baseline decreased in these regions also exist with diaphragmatic GI attenuation, intermediate restudy with summed difference score 4, EF 60%, normal wall motion.    Findings consistent with an equivocal ECG stress test.    Intermediate restudy  Correlate with clinical risk factors and symptoms  Mild reversibility of the inferior wall, summed difference score 4  Normal EF 60%  Attenuation artifact was seen at baseline in the inferior wall      Most recent cardiac catheterization as reviewed interpreted by me:  No results found for this or any previous visit.    The following portions of the patient's history were reviewed and updated as appropriate: allergies, current medications, past family history, past medical history, past social history, past surgical history, and problem list.      ROS:  14 point review of systems negative except as mentioned above    Current Outpatient Medications:     aspirin 81 MG EC tablet, Take 1 tablet by mouth Daily., Disp: , Rfl:     atorvastatin (LIPITOR) 20 MG tablet, Take 1 tablet by mouth Daily., Disp: 90 tablet, Rfl: 1    cetirizine (zyrTEC) 10 MG tablet, Take 1 tablet by mouth Daily., Disp: , Rfl:     cholecalciferol (VITAMIN D3) 1000 units tablet, Take 1 tablet by mouth Daily., Disp: , Rfl:     cloNIDine (CATAPRES) 0.1 MG  tablet, Take 1 tablet by mouth Daily., Disp: , Rfl:     colestipol (COLESTID) 1 g tablet, Take 2 tablets by mouth Every 12 (Twelve) Hours., Disp: , Rfl:     cyanocobalamin 1000 MCG/ML injection, Inject 1 mL under the skin into the appropriate area as directed Every 30 (Thirty) Days., Disp: , Rfl:     cyclobenzaprine (FLEXERIL) 10 MG tablet, Take 1 tablet by mouth 2 (Two) Times a Day., Disp: , Rfl:     dilTIAZem CD (CARDIZEM CD) 180 MG 24 hr capsule, TAKE 1 CAPSULE BY MOUTH DAILY, Disp: 90 capsule, Rfl: 1    famotidine (PEPCID) 40 MG tablet, Take 1 tablet by mouth every night at bedtime., Disp: , Rfl:     Farxiga 5 MG tablet tablet, Take 1 tablet by mouth Daily., Disp: , Rfl:     FLUoxetine (PROzac) 20 MG capsule, Take 1 capsule by mouth Daily., Disp: , Rfl:     FLUoxetine (PROzac) 40 MG capsule, 1 capsule Daily., Disp: , Rfl:     glyburide (DIAbeta) 1.25 MG tablet, Take 1 tablet by mouth Every 12 (Twelve) Hours., Disp: , Rfl:     guaiFENesin (MUCINEX) 600 MG 12 hr tablet, Take 2 tablets by mouth 2 (Two) Times a Day., Disp: , Rfl:     Magnesium 250 MG tablet, Take 1 tablet by mouth Daily., Disp: , Rfl:     metFORMIN (GLUCOPHAGE) 500 MG tablet, Take 2 tablets by mouth Daily With Breakfast., Disp: , Rfl:     metFORMIN (GLUCOPHAGE) 500 MG tablet, Take 1 tablet by mouth Daily With Dinner., Disp: , Rfl:     metoprolol succinate XL (TOPROL-XL) 25 MG 24 hr tablet, Take 1 tablet by mouth Daily., Disp: 90 tablet, Rfl: 3    omeprazole (priLOSEC) 40 MG capsule, Take 1 capsule by mouth Daily., Disp: , Rfl:     pantoprazole (PROTONIX) 40 MG EC tablet, Take 1 tablet by mouth Every Morning., Disp: , Rfl:     Probiotic Product (ALIGN DUALBIOTIC PO), Take 1 capsule by mouth 2 (Two) Times a Day., Disp: , Rfl:     rivaroxaban (XARELTO) 20 MG tablet, Take 1 tablet by mouth Daily., Disp: , Rfl:     tolterodine (DETROL) 2 MG tablet, Take 1 tablet by mouth Daily., Disp: , Rfl:     Problem List:  Patient Active Problem List   Diagnosis     Essential hypertension    Depression    Gastroesophageal reflux disease    Paroxysmal atrial fibrillation    RUTHIE treated with BiPAP    Astigmatism    Hypermetropia    Migraine    Presbyopia    Class 3 severe obesity due to excess calories without serious comorbidity with body mass index (BMI) of 45.0 to 49.9 in adult     Past Medical History:  Past Medical History:   Diagnosis Date    AF (paroxysmal atrial fibrillation)     Coronary artery disease     Essential hypertension 7/21/2017    Gastroesophageal reflux disease 3/23/2020    GERD (gastroesophageal reflux disease)     Paroxysmal atrial fibrillation 3/23/2020    Sleep apnea 3/23/2020     Past Surgical History:  Past Surgical History:   Procedure Laterality Date    BREAST BIOPSY Right     CORONARY ARTERY BYPASS GRAFT      HYSTERECTOMY       Social History:  Social History     Socioeconomic History    Marital status:    Tobacco Use    Smoking status: Never     Passive exposure: Never    Smokeless tobacco: Never   Vaping Use    Vaping status: Never Used   Substance and Sexual Activity    Alcohol use: Not Currently    Drug use: Never    Sexual activity: Defer     Allergies:  No Known Allergies  Immunizations:    There is no immunization history on file for this patient.         In-Office Procedure(s):  No orders to display        ASCVD RIsk Score::  The 10-year ASCVD risk score (Amrita DK, et al., 2019) is: 7.3%    Values used to calculate the score:      Age: 59 years      Sex: Female      Is Non- : No      Diabetic: Yes      Tobacco smoker: No      Systolic Blood Pressure: 112 mmHg      Is BP treated: Yes      HDL Cholesterol: 54 mg/dL      Total Cholesterol: 202 mg/dL    Imaging:    Results for orders placed during the hospital encounter of 08/06/20    XR Chest AP 08/06/2020  8:48 PM    Narrative  Examination: XR CHEST AP-    Date of Exam: 8/6/2020 8:44 PM    Indication: COVID Evaluation, Cough, Fever.    Comparison: None  available.    Technique: 1 view of the chest    Findings:  There are no airspace consolidations. No pleural effusions. No  pneumothorax. The heart size is normal. The pulmonary vasculature  appears within normal limits. No acute osseous abnormality identified.    Impression  No acute cardiopulmonary process.    Electronically Signed By-Melina Terry On:8/6/2020 8:48 PM  This report was finalized on [id number] by  Melina Terry, .       Results for orders placed during the hospital encounter of 01/20/25    CT Angiogram Coronary-Cardiology Interpretation 01/21/2025  1:31 PM    Narrative  Table formatting from the original result was not included.  Images from the original result were not included.  CORONARY CT ANGIOGRAPHY WITH CALCIUM SCORE    CLINICAL HISTORY: Patient is a 59-year-old female with history of paroxysmal atrial fibrillation, hypertension diabetes, referred for coronary CTA after abnormal stress test.    TECHNIQUE: Using a Siemens Drive scanner, a preliminary  study was obtained, followed by coronary artery calcium protocol. 0.5 mm collimated images were obtained through the coronary arteries.  Data were transferred offline for 3D reconstructions using [employer].    CONTRAST: 100mL iopamidol (ISOVUE-370)    ACQUISITION: Retrospective ECG triggered acquisition was used.  Heart rate at the time of acquisition was approximately 62 BPM.    MEDICATIONS:  Metoprolol tartrate 50 mg oral  Nitroglycerin 0.8 mg tablet    TECHNICAL QUALITY: Acceptable, with moderate artifacts.    FINDINGS:    Coronary Artery Calcification Findings:  The total calcium score is 1.5 indicating mild (CAC 1-100) calcified plaque in the coronary tree.  Left main: 0  LAD: 0  LCx: 1.5  RCA: 0    Angiographic Findings:  The coronary arteries are right dominant.  Left Main: The left main is a large caliber vessel with a normal take off from the left coronary cusp that bifurcates into LAD and LCx. There is no plaque or stenosis.  LAD:  Patent with no evidence of plaque or stenosis. The LAD gives off 1 medium caliber patent diagonal branches without stenosis and wraps the apex.  Ramus: Medium caliber vessel, patent with no evidence of plaque or stenosis.  LCx: Small caliber vessel, minimal calcification.  Sub-optimal visualization of left circumflex vessel, but no obvious significant stenosis noted.  RCA: Normal origin from the right coronary cusp. Patent with no evidence of plaque or stenosis. The RCA terminates as a PDA and right posterolateral branch without evidence of plaque or stenosis.    Noncoronary Cardiac Findings:  Cardiac chambers: Normal in size with no obvious filling defects within the ventricles, atria, or atrial appendages. No stigmata or prior infarction.  Cardiac valves: Normal trileaflet aortic valve. No thickening or calcifications in the aortic and mitral valves.  Pulmonary arteries: Normal in caliber. No obvious filling defects in the visualized central pulmonary arteri(es) to suggest large central pulmonary emboli.  Pericardium: The pericardial contour is preserved with no effusion, thickening, or calcifications.  Left ventricle: Calculated LVEF 76%.  Aorta: Normal size, no significant calcification.    Impression  Minimal coronary calcification, calcium score 1.5.  There is minimal coronary calcification in the left circumflex vessel.  Suboptimal visualization of Lcx due to motion artifact, but no obvious significant stenosis noted.  Minimal luminal irregularities in the RCA.  CAD RADS 1.  No non-atherosclerotic coronary abnormalities.  Normal LV systolic function (calculated LVEF 76%).  Please refer to the radiology report for information on extra-cardiac structures.    RECOMMENDATION:  Continue risk factor modification for coronary artery disease.    Grading Scale for Stenosis Severity:  Category Degree Interpretation  CAD-RADS 0 0% (No plaque or stenosis) Absence of CAD  CAD-RADS 1 1-24% (Minimal stenosis or plaque w/o  stenosis) Minimal non-obstructive CAD  CAD-RADS 2 25-49% (Mild stenosis) Mild non-obstructive CAD  CAD-RADS 3 50-69% (Moderate stenosis) Moderate stenosis  CAD-RADS 4 A - 70-99% stenosis or  B - Left main >/= 50% or 3-vessel obstructive (>/=70%) disease Severe stenosis  CAD-RADS 5 100% (total occlusion) Total coronary occlusion or sub-total occlusion  CAD-RADS N Non-diagnostic study Obstructive CAD cannot be excluded    Grading Scale for Plaque [place]:  P1 (CAC 1-100) Mild amount of plaque  P2 (-300) Moderate amount of plaque  P3 (-999) Severe amount of plaque  P4 (CAC > 1000) Extensive amount of plaque          Electronically signed by Barry Mullins MD, 01/21/25, 1:31 PM EST.      Results for orders placed during the hospital encounter of 01/20/25    CT Angiogram Coronary-Cardiology Interpretation 01/21/2025  1:31 PM    Narrative  Table formatting from the original result was not included.  Images from the original result were not included.  CORONARY CT ANGIOGRAPHY WITH CALCIUM SCORE    CLINICAL HISTORY: Patient is a 59-year-old female with history of paroxysmal atrial fibrillation, hypertension diabetes, referred for coronary CTA after abnormal stress test.    TECHNIQUE: Using a Siemens Drive scanner, a preliminary  study was obtained, followed by coronary artery calcium protocol. 0.5 mm collimated images were obtained through the coronary arteries.  Data were transferred offline for 3D reconstructions using [employer].    CONTRAST: 100mL iopamidol (ISOVUE-370)    ACQUISITION: Retrospective ECG triggered acquisition was used.  Heart rate at the time of acquisition was approximately 62 BPM.    MEDICATIONS:  Metoprolol tartrate 50 mg oral  Nitroglycerin 0.8 mg tablet    TECHNICAL QUALITY: Acceptable, with moderate artifacts.    FINDINGS:    Coronary Artery Calcification Findings:  The total calcium score is 1.5 indicating mild (CAC 1-100) calcified plaque in the coronary  tree.  Left main: 0  LAD: 0  LCx: 1.5  RCA: 0    Angiographic Findings:  The coronary arteries are right dominant.  Left Main: The left main is a large caliber vessel with a normal take off from the left coronary cusp that bifurcates into LAD and LCx. There is no plaque or stenosis.  LAD: Patent with no evidence of plaque or stenosis. The LAD gives off 1 medium caliber patent diagonal branches without stenosis and wraps the apex.  Ramus: Medium caliber vessel, patent with no evidence of plaque or stenosis.  LCx: Small caliber vessel, minimal calcification.  Sub-optimal visualization of left circumflex vessel, but no obvious significant stenosis noted.  RCA: Normal origin from the right coronary cusp. Patent with no evidence of plaque or stenosis. The RCA terminates as a PDA and right posterolateral branch without evidence of plaque or stenosis.    Noncoronary Cardiac Findings:  Cardiac chambers: Normal in size with no obvious filling defects within the ventricles, atria, or atrial appendages. No stigmata or prior infarction.  Cardiac valves: Normal trileaflet aortic valve. No thickening or calcifications in the aortic and mitral valves.  Pulmonary arteries: Normal in caliber. No obvious filling defects in the visualized central pulmonary arteri(es) to suggest large central pulmonary emboli.  Pericardium: The pericardial contour is preserved with no effusion, thickening, or calcifications.  Left ventricle: Calculated LVEF 76%.  Aorta: Normal size, no significant calcification.    Impression  Minimal coronary calcification, calcium score 1.5.  There is minimal coronary calcification in the left circumflex vessel.  Suboptimal visualization of Lcx due to motion artifact, but no obvious significant stenosis noted.  Minimal luminal irregularities in the RCA.  CAD RADS 1.  No non-atherosclerotic coronary abnormalities.  Normal LV systolic function (calculated LVEF 76%).  Please refer to the radiology report for information  on extra-cardiac structures.    RECOMMENDATION:  Continue risk factor modification for coronary artery disease.    Grading Scale for Stenosis Severity:  Category Degree Interpretation  CAD-RADS 0 0% (No plaque or stenosis) Absence of CAD  CAD-RADS 1 1-24% (Minimal stenosis or plaque w/o stenosis) Minimal non-obstructive CAD  CAD-RADS 2 25-49% (Mild stenosis) Mild non-obstructive CAD  CAD-RADS 3 50-69% (Moderate stenosis) Moderate stenosis  CAD-RADS 4 A - 70-99% stenosis or  B - Left main >/= 50% or 3-vessel obstructive (>/=70%) disease Severe stenosis  CAD-RADS 5 100% (total occlusion) Total coronary occlusion or sub-total occlusion  CAD-RADS N Non-diagnostic study Obstructive CAD cannot be excluded    Grading Scale for Plaque [place]:  P1 (CAC 1-100) Mild amount of plaque  P2 (-300) Moderate amount of plaque  P3 (-999) Severe amount of plaque  P4 (CAC > 1000) Extensive amount of plaque          Electronically signed by Barry Mullins MD, 01/21/25, 1:31 PM EST.      Lab Review:   Orders Only on 04/03/2025   Component Date Value    TyrerCuzick 04/03/2025 8.6     NCCN 04/03/2025 NCCN met (A)    Hospital Outpatient Visit on 01/17/2025   Component Date Value    Creatinine 01/17/2025 0.90     eGFR 01/17/2025 72.9      Recent labs reviewed and interpreted for clinical significance and application            Level of Care:           Bull Barr MD  07/17/25  .

## 2025-08-05 RX ORDER — ATORVASTATIN CALCIUM 20 MG/1
20 TABLET, FILM COATED ORAL DAILY
Qty: 90 TABLET | Refills: 1 | Status: SHIPPED | OUTPATIENT
Start: 2025-08-05